# Patient Record
Sex: FEMALE | Race: WHITE | NOT HISPANIC OR LATINO
[De-identification: names, ages, dates, MRNs, and addresses within clinical notes are randomized per-mention and may not be internally consistent; named-entity substitution may affect disease eponyms.]

---

## 2018-11-02 ENCOUNTER — RESULT REVIEW (OUTPATIENT)
Age: 28
End: 2018-11-02

## 2019-09-24 ENCOUNTER — RECORD ABSTRACTING (OUTPATIENT)
Age: 29
End: 2019-09-24

## 2019-09-24 ENCOUNTER — APPOINTMENT (OUTPATIENT)
Dept: OBGYN | Facility: CLINIC | Age: 29
End: 2019-09-24
Payer: COMMERCIAL

## 2019-09-24 VITALS
BODY MASS INDEX: 28.75 KG/M2 | DIASTOLIC BLOOD PRESSURE: 66 MMHG | WEIGHT: 162.25 LBS | HEIGHT: 63 IN | SYSTOLIC BLOOD PRESSURE: 98 MMHG

## 2019-09-24 DIAGNOSIS — Z20.2 CONTACT WITH AND (SUSPECTED) EXPOSURE TO INFECTIONS WITH A PREDOMINANTLY SEXUAL MODE OF TRANSMISSION: ICD-10-CM

## 2019-09-24 DIAGNOSIS — Z78.9 OTHER SPECIFIED HEALTH STATUS: ICD-10-CM

## 2019-09-24 DIAGNOSIS — Z30.41 ENCOUNTER FOR SURVEILLANCE OF CONTRACEPTIVE PILLS: ICD-10-CM

## 2019-09-24 DIAGNOSIS — Z82.49 FAMILY HISTORY OF ISCHEMIC HEART DISEASE AND OTHER DISEASES OF THE CIRCULATORY SYSTEM: ICD-10-CM

## 2019-09-24 DIAGNOSIS — Z80.41 FAMILY HISTORY OF MALIGNANT NEOPLASM OF OVARY: ICD-10-CM

## 2019-09-24 DIAGNOSIS — Z11.51 ENCOUNTER FOR SCREENING FOR HUMAN PAPILLOMAVIRUS (HPV): ICD-10-CM

## 2019-09-24 DIAGNOSIS — Z01.419 ENCOUNTER FOR GYNECOLOGICAL EXAMINATION (GENERAL) (ROUTINE) W/OUT ABNORMAL FINDINGS: ICD-10-CM

## 2019-09-24 DIAGNOSIS — Z12.4 ENCOUNTER FOR SCREENING FOR MALIGNANT NEOPLASM OF CERVIX: ICD-10-CM

## 2019-09-24 PROBLEM — Z00.00 ENCOUNTER FOR PREVENTIVE HEALTH EXAMINATION: Status: ACTIVE | Noted: 2019-09-24

## 2019-09-24 LAB — CYTOLOGY CVX/VAG DOC THIN PREP: NEGATIVE

## 2019-09-24 PROCEDURE — 99385 PREV VISIT NEW AGE 18-39: CPT

## 2019-09-24 NOTE — HISTORY OF PRESENT ILLNESS
[Normal Amount/Duration] : was of a normal amount and duration [Regular Cycle Intervals] : periods have been regular [Frequency: Q ___ days] : menstrual periods occur approximately every [unfilled] days [Menarche Age: ____] : age at menarche was [unfilled] [Sexually Active] : is sexually active [Spotting Between  Menses] : no spotting between menses [Contraception] : does not use contraception

## 2019-09-24 NOTE — PHYSICAL EXAM
[Awake] : awake [Alert] : alert [Mass] : no breast mass [Acute Distress] : no acute distress [Nipple Discharge] : no nipple discharge [Axillary LAD] : no axillary lymphadenopathy [Soft] : soft [Oriented x3] : oriented to person, place, and time [Tender] : non tender [Normal] : uterus [No Bleeding] : there was no active vaginal bleeding [Uterine Adnexae] : were not tender and not enlarged

## 2019-09-26 LAB — HPV HIGH+LOW RISK DNA PNL CVX: NOT DETECTED

## 2019-10-02 LAB — CYTOLOGY CVX/VAG DOC THIN PREP: NORMAL

## 2020-04-03 ENCOUNTER — RESULT REVIEW (OUTPATIENT)
Age: 30
End: 2020-04-03

## 2020-10-08 ENCOUNTER — OUTPATIENT (OUTPATIENT)
Dept: OUTPATIENT SERVICES | Facility: HOSPITAL | Age: 30
LOS: 1 days | End: 2020-10-08
Payer: COMMERCIAL

## 2020-10-08 DIAGNOSIS — Z11.59 ENCOUNTER FOR SCREENING FOR OTHER VIRAL DISEASES: ICD-10-CM

## 2020-10-08 LAB — SARS-COV-2 RNA SPEC QL NAA+PROBE: SIGNIFICANT CHANGE UP

## 2020-10-08 PROCEDURE — U0003: CPT

## 2020-10-09 ENCOUNTER — INPATIENT (INPATIENT)
Facility: HOSPITAL | Age: 30
LOS: 2 days | Discharge: HOME | End: 2020-10-12
Payer: COMMERCIAL

## 2020-10-09 ENCOUNTER — TRANSCRIPTION ENCOUNTER (OUTPATIENT)
Age: 30
End: 2020-10-09

## 2020-10-09 VITALS
RESPIRATION RATE: 17 BRPM | HEART RATE: 88 BPM | SYSTOLIC BLOOD PRESSURE: 118 MMHG | TEMPERATURE: 98 F | DIASTOLIC BLOOD PRESSURE: 72 MMHG

## 2020-10-09 DIAGNOSIS — O47.1 FALSE LABOR AT OR AFTER 37 COMPLETED WEEKS OF GESTATION: ICD-10-CM

## 2020-10-09 DIAGNOSIS — Z98.890 OTHER SPECIFIED POSTPROCEDURAL STATES: Chronic | ICD-10-CM

## 2020-10-09 DIAGNOSIS — J34.2 DEVIATED NASAL SEPTUM: Chronic | ICD-10-CM

## 2020-10-09 LAB
BASOPHILS # BLD AUTO: 0.03 K/UL — SIGNIFICANT CHANGE UP (ref 0–0.2)
BASOPHILS NFR BLD AUTO: 0.2 % — SIGNIFICANT CHANGE UP (ref 0–2)
BLD GP AB SCN SERPL QL: SIGNIFICANT CHANGE UP
EOSINOPHIL # BLD AUTO: 0.24 K/UL — SIGNIFICANT CHANGE UP (ref 0–0.5)
EOSINOPHIL NFR BLD AUTO: 2 % — SIGNIFICANT CHANGE UP (ref 0–6)
HCT VFR BLD CALC: 35 % — SIGNIFICANT CHANGE UP (ref 34.5–45)
HGB BLD-MCNC: 11.5 G/DL — SIGNIFICANT CHANGE UP (ref 11.5–15.5)
IMM GRANULOCYTES NFR BLD AUTO: 1.1 % — SIGNIFICANT CHANGE UP (ref 0–1.5)
LYMPHOCYTES # BLD AUTO: 2.53 K/UL — SIGNIFICANT CHANGE UP (ref 1–3.3)
LYMPHOCYTES # BLD AUTO: 20.7 % — SIGNIFICANT CHANGE UP (ref 13–44)
MCHC RBC-ENTMCNC: 30.6 PG — SIGNIFICANT CHANGE UP (ref 27–34)
MCHC RBC-ENTMCNC: 32.9 GM/DL — SIGNIFICANT CHANGE UP (ref 32–36)
MCV RBC AUTO: 93.1 FL — SIGNIFICANT CHANGE UP (ref 80–100)
MONOCYTES # BLD AUTO: 0.66 K/UL — SIGNIFICANT CHANGE UP (ref 0–0.9)
MONOCYTES NFR BLD AUTO: 5.4 % — SIGNIFICANT CHANGE UP (ref 2–14)
NEUTROPHILS # BLD AUTO: 8.64 K/UL — HIGH (ref 1.8–7.4)
NEUTROPHILS NFR BLD AUTO: 70.6 % — SIGNIFICANT CHANGE UP (ref 43–77)
PLATELET # BLD AUTO: 284 K/UL — SIGNIFICANT CHANGE UP (ref 150–400)
RBC # BLD: 3.76 M/UL — LOW (ref 3.8–5.2)
RBC # FLD: 13.2 % — SIGNIFICANT CHANGE UP (ref 10.3–14.5)
WBC # BLD: 12.23 K/UL — HIGH (ref 3.8–10.5)
WBC # FLD AUTO: 12.23 K/UL — HIGH (ref 3.8–10.5)

## 2020-10-09 RX ORDER — CITRIC ACID/SODIUM CITRATE 300-500 MG
30 SOLUTION, ORAL ORAL ONCE
Refills: 0 | Status: COMPLETED | OUTPATIENT
Start: 2020-10-09 | End: 2020-10-10

## 2020-10-09 RX ORDER — OXYTOCIN 10 UNIT/ML
333.33 VIAL (ML) INJECTION
Qty: 20 | Refills: 0 | Status: DISCONTINUED | OUTPATIENT
Start: 2020-10-09 | End: 2020-10-12

## 2020-10-09 RX ORDER — SODIUM CHLORIDE 9 MG/ML
1000 INJECTION, SOLUTION INTRAVENOUS
Refills: 0 | Status: DISCONTINUED | OUTPATIENT
Start: 2020-10-09 | End: 2020-10-10

## 2020-10-09 RX ADMIN — SODIUM CHLORIDE 125 MILLILITER(S): 9 INJECTION, SOLUTION INTRAVENOUS at 19:33

## 2020-10-09 NOTE — OB PROVIDER H&P - NSHPPHYSICALEXAM_GEN_ALL_CORE
Vital Signs Last 24 Hrs  T(C): 36.7 (09 Oct 2020 18:50), Max: 36.7 (09 Oct 2020 18:49)  T(F): 98.1 (09 Oct 2020 18:50), Max: 98.1 (09 Oct 2020 18:50)  HR: 88 (09 Oct 2020 18:50) (88 - 88)  BP: 118/72 (09 Oct 2020 18:50) (118/72 - 118/72)  RR: 17 (09 Oct 2020 18:50) (17 - 17)    General: NAD, well-appearing  Heart: RRR  Lungs: CTAB  Abdominal: soft, gravid  Ext: non-tender, non-edematous   SVE: 0/0/-3 at 2021  Bedside sono: vertex, posterior placenta, EFW 3200g Vital Signs Last 24 Hrs  T(C): 36.7 (09 Oct 2020 18:50), Max: 36.7 (09 Oct 2020 18:49)  T(F): 98.1 (09 Oct 2020 18:50), Max: 98.1 (09 Oct 2020 18:50)  HR: 88 (09 Oct 2020 18:50) (88 - 88)  BP: 118/72 (09 Oct 2020 18:50) (118/72 - 118/72)  RR: 17 (09 Oct 2020 18:50) (17 - 17)    General: NAD, well-appearing  Heart: RRR  Lungs: CTAB  Abdominal: soft, gravid  Ext: non-tender, non-edematous   SVE: 0/0/-3 at 2021  Bedside sono: vertex, posterior placenta, EFW 3200g  FHT: baseline 140s, moderate variability, +accels, -decels   [place]: Uterine irritability

## 2020-10-09 NOTE — OB PROVIDER H&P - ASSESSMENT
A/P: 30 year old  at 40.5 weeks GA by LMP consistent with a 7.6 weeks sono who is being admitted for an elective induction of labor.  -Admit to L&D  -Consent  -Admission labs  -NPO, except ice chips   -IV fluids  -Labor: Intact. Not in labor. No contractions. Will begin induction of labor with cytotec PO.   -Fetus: Reactive. Continuous toco and fetal monitoring.   -GBS: Negative, no GBS ppx required   -Analgesia: Will desire an epidural.   -DVT ppx: Ambulate and SCD's while in bed   -Male fetus, will desire a circumcision     Discussed with Dr. Kang.

## 2020-10-10 LAB — T PALLIDUM AB TITR SER: NEGATIVE — SIGNIFICANT CHANGE UP

## 2020-10-10 RX ORDER — FAMOTIDINE 10 MG/ML
20 INJECTION INTRAVENOUS ONCE
Refills: 0 | Status: COMPLETED | OUTPATIENT
Start: 2020-10-10 | End: 2020-10-10

## 2020-10-10 RX ORDER — DIPHENHYDRAMINE HCL 50 MG
25 CAPSULE ORAL EVERY 6 HOURS
Refills: 0 | Status: DISCONTINUED | OUTPATIENT
Start: 2020-10-10 | End: 2020-10-12

## 2020-10-10 RX ORDER — ENOXAPARIN SODIUM 100 MG/ML
40 INJECTION SUBCUTANEOUS EVERY 24 HOURS
Refills: 0 | Status: DISCONTINUED | OUTPATIENT
Start: 2020-10-10 | End: 2020-10-12

## 2020-10-10 RX ORDER — LANOLIN
1 OINTMENT (GRAM) TOPICAL EVERY 6 HOURS
Refills: 0 | Status: DISCONTINUED | OUTPATIENT
Start: 2020-10-10 | End: 2020-10-12

## 2020-10-10 RX ORDER — SODIUM CHLORIDE 9 MG/ML
1000 INJECTION, SOLUTION INTRAVENOUS
Refills: 0 | Status: DISCONTINUED | OUTPATIENT
Start: 2020-10-10 | End: 2020-10-11

## 2020-10-10 RX ORDER — NALOXONE HYDROCHLORIDE 4 MG/.1ML
0.1 SPRAY NASAL
Refills: 0 | Status: DISCONTINUED | OUTPATIENT
Start: 2020-10-10 | End: 2020-10-12

## 2020-10-10 RX ORDER — CEFAZOLIN SODIUM 1 G
2000 VIAL (EA) INJECTION ONCE
Refills: 0 | Status: COMPLETED | OUTPATIENT
Start: 2020-10-10 | End: 2020-10-10

## 2020-10-10 RX ORDER — OXYTOCIN 10 UNIT/ML
333.33 VIAL (ML) INJECTION
Qty: 20 | Refills: 0 | Status: DISCONTINUED | OUTPATIENT
Start: 2020-10-10 | End: 2020-10-12

## 2020-10-10 RX ORDER — ACETAMINOPHEN 500 MG
975 TABLET ORAL
Refills: 0 | Status: DISCONTINUED | OUTPATIENT
Start: 2020-10-10 | End: 2020-10-12

## 2020-10-10 RX ORDER — IBUPROFEN 200 MG
600 TABLET ORAL EVERY 6 HOURS
Refills: 0 | Status: COMPLETED | OUTPATIENT
Start: 2020-10-10 | End: 2021-09-08

## 2020-10-10 RX ORDER — MAGNESIUM HYDROXIDE 400 MG/1
30 TABLET, CHEWABLE ORAL
Refills: 0 | Status: DISCONTINUED | OUTPATIENT
Start: 2020-10-10 | End: 2020-10-12

## 2020-10-10 RX ORDER — DEXAMETHASONE 0.5 MG/5ML
4 ELIXIR ORAL EVERY 6 HOURS
Refills: 0 | Status: DISCONTINUED | OUTPATIENT
Start: 2020-10-10 | End: 2020-10-12

## 2020-10-10 RX ORDER — ONDANSETRON 8 MG/1
4 TABLET, FILM COATED ORAL ONCE
Refills: 0 | Status: COMPLETED | OUTPATIENT
Start: 2020-10-10 | End: 2020-10-10

## 2020-10-10 RX ORDER — TETANUS TOXOID, REDUCED DIPHTHERIA TOXOID AND ACELLULAR PERTUSSIS VACCINE, ADSORBED 5; 2.5; 8; 8; 2.5 [IU]/.5ML; [IU]/.5ML; UG/.5ML; UG/.5ML; UG/.5ML
0.5 SUSPENSION INTRAMUSCULAR ONCE
Refills: 0 | Status: COMPLETED | OUTPATIENT
Start: 2020-10-10

## 2020-10-10 RX ORDER — ONDANSETRON 8 MG/1
4 TABLET, FILM COATED ORAL EVERY 6 HOURS
Refills: 0 | Status: DISCONTINUED | OUTPATIENT
Start: 2020-10-10 | End: 2020-10-12

## 2020-10-10 RX ORDER — OXYCODONE HYDROCHLORIDE 5 MG/1
5 TABLET ORAL
Refills: 0 | Status: DISCONTINUED | OUTPATIENT
Start: 2020-10-10 | End: 2020-10-12

## 2020-10-10 RX ORDER — OXYCODONE HYDROCHLORIDE 5 MG/1
5 TABLET ORAL ONCE
Refills: 0 | Status: DISCONTINUED | OUTPATIENT
Start: 2020-10-10 | End: 2020-10-12

## 2020-10-10 RX ORDER — SIMETHICONE 80 MG/1
80 TABLET, CHEWABLE ORAL EVERY 4 HOURS
Refills: 0 | Status: DISCONTINUED | OUTPATIENT
Start: 2020-10-10 | End: 2020-10-12

## 2020-10-10 RX ORDER — KETOROLAC TROMETHAMINE 30 MG/ML
30 SYRINGE (ML) INJECTION EVERY 6 HOURS
Refills: 0 | Status: DISCONTINUED | OUTPATIENT
Start: 2020-10-10 | End: 2020-10-11

## 2020-10-10 RX ADMIN — FAMOTIDINE 20 MILLIGRAM(S): 10 INJECTION INTRAVENOUS at 03:08

## 2020-10-10 RX ADMIN — Medication 100 MILLIGRAM(S): at 18:09

## 2020-10-10 RX ADMIN — ONDANSETRON 4 MILLIGRAM(S): 8 TABLET, FILM COATED ORAL at 05:07

## 2020-10-10 RX ADMIN — Medication 30 MILLILITER(S): at 17:30

## 2020-10-10 RX ADMIN — Medication 1000 MILLIUNIT(S)/MIN: at 18:18

## 2020-10-10 RX ADMIN — ONDANSETRON 4 MILLIGRAM(S): 8 TABLET, FILM COATED ORAL at 19:58

## 2020-10-10 RX ADMIN — SODIUM CHLORIDE 125 MILLILITER(S): 9 INJECTION, SOLUTION INTRAVENOUS at 23:20

## 2020-10-10 NOTE — OB PROVIDER LABOR PROGRESS NOTE - NS_SUBJECTIVE/OBJECTIVE_OBGYN_ALL_OB_FT
Patient seen and examined at bedside. She is doing well. No complaints at this time.   Vital Signs Last 24 Hrs  T(C): 36.9 (09 Oct 2020 21:05), Max: 36.9 (09 Oct 2020 21:05)  T(F): 98.42 (09 Oct 2020 21:05), Max: 98.42 (09 Oct 2020 21:05)  HR: 88 (09 Oct 2020 21:05) (88 - 88)  BP: 107/61 (09 Oct 2020 21:05) (107/61 - 118/72)  RR: 16 (09 Oct 2020 21:05) (16 - 17)

## 2020-10-10 NOTE — OB PROVIDER DELIVERY SUMMARY - NSPROVIDERDELIVERYNOTE_OBGYN_ALL_OB_FT
Pt taken to the OR for primary C/S due to failure to progress, but once in the OR, ++NRFHT and C/S made urgent.  C/S performed under spinal anesthesia  (given prior to fetal deceleration) by Dr IVÁN Owen, assist Dr EMILY Dubose, PGY-4.  Delivered live male infant, VTX, through clear amniotic fluid at 18:17.  No nuchal cord.  Delayed cord clamping for 30sec, then  to warmer, neonatologist present.  APGAR 9/9, weight 3160g, 6lb 15oz.  Uterus, tubes, ovaries WNL.  No intraop complications.  QBL 835cc, UO 100cc, clear.  Baby later admitted to N, mother stable.

## 2020-10-10 NOTE — OB PROVIDER LABOR PROGRESS NOTE - NS_SUBJECTIVE/OBJECTIVE_OBGYN_ALL_OB_FT
29 y/o  at 40w6d admitted for elective induction of labor.   Patient examined at bedside after a deceleration was noted on FHT.

## 2020-10-10 NOTE — OB NEONATOLOGY/PEDIATRICIAN DELIVERY SUMMARY - NSPEDSNEONOTESA_OBGYN_ALL_OB_FT
Called to OR #1  by Phuong Owen MD to attend primary C/S at 40.6 weeks failure to descend and NRFHR tracing.  Maternal Obstetric/Medical History:  30 year old  , blood type A positive, serology NR , HBsAg negative , GBS negative , HIV negative , Rubella immune .  EDC 10/4/2020.  Denies allergies, denies diabetes, denies hypertension, denies asthma.  Family History:  Non contributory  Social History:  , denies smoking, denies alcohol abuse, denies illicit drug use.  Labor and delivery.  AROM 10/10/2020 @  C/S  with clear amniotic fluid..  Delivered  10/10/2020 @ 1817 hours with good cry, alert and responsive.   Place on radiant warmer bed, dried, repositioned and suctioned with bulb syringe.  Physical examination benign. consistent with term gestation.  Apgar score 9 and 9 at 1 and 5 minutes respectively.   Male.  Bwt: 3160g..

## 2020-10-10 NOTE — OB PROVIDER LABOR PROGRESS NOTE - NS_OBIHIFHRDETAILS_OBGYN_ALL_OB_FT
baseline 135, mod variability, no accels, no decels
baseline 125, moderate variability, +accels, -decels
baseline 125, moderate variability, +accels, one 2-3 min decel noted with recovery and no recurrence

## 2020-10-10 NOTE — OB RN DELIVERY SUMMARY - NS_SEPSISRSKCALC_OBGYN_ALL_OB_FT
EOS calculated successfully. EOS Risk Factor: 0.5/1000 live births ([facility] national incidence); GA=40w6d; Temp=98.42; ROM=0.017; GBS='Negative'; Antibiotics='No antibiotics or any antibiotics < 2 hrs prior to birth'

## 2020-10-10 NOTE — OB PROVIDER LABOR PROGRESS NOTE - ASSESSMENT
-Cat I tracing  -last checked @14:00 by Dr Owen, 0/0/-3  -reassess  -continue to monitor
30yp  at 40.6 weeks GA here for an elective induction of labor.  -VSS  -Cat 1 tracing  -Uterine irritability  -Intact  -Continue induction with cytotec PO
31 y/o  at 40w6d admitted for elective induction of labor.   - FHT deceleration resolved with maternal repositioning IV fluids, and O2   - continue Cytotec PO course  - Will continue to monitor FHT/[place] and reassess for cervical change when clinically indicated.     d/w Dr. Kang

## 2020-10-11 ENCOUNTER — TRANSCRIPTION ENCOUNTER (OUTPATIENT)
Age: 30
End: 2020-10-11

## 2020-10-11 LAB
BASOPHILS # BLD AUTO: 0.04 K/UL — SIGNIFICANT CHANGE UP (ref 0–0.2)
BASOPHILS NFR BLD AUTO: 0.3 % — SIGNIFICANT CHANGE UP (ref 0–2)
EOSINOPHIL # BLD AUTO: 0.35 K/UL — SIGNIFICANT CHANGE UP (ref 0–0.5)
EOSINOPHIL NFR BLD AUTO: 2.4 % — SIGNIFICANT CHANGE UP (ref 0–6)
HCT VFR BLD CALC: 29.2 % — LOW (ref 34.5–45)
HGB BLD-MCNC: 9.3 G/DL — LOW (ref 11.5–15.5)
IMM GRANULOCYTES NFR BLD AUTO: 0.5 % — SIGNIFICANT CHANGE UP (ref 0–1.5)
LYMPHOCYTES # BLD AUTO: 1.9 K/UL — SIGNIFICANT CHANGE UP (ref 1–3.3)
LYMPHOCYTES # BLD AUTO: 13 % — SIGNIFICANT CHANGE UP (ref 13–44)
MCHC RBC-ENTMCNC: 30.2 PG — SIGNIFICANT CHANGE UP (ref 27–34)
MCHC RBC-ENTMCNC: 31.8 GM/DL — LOW (ref 32–36)
MCV RBC AUTO: 94.8 FL — SIGNIFICANT CHANGE UP (ref 80–100)
MONOCYTES # BLD AUTO: 0.72 K/UL — SIGNIFICANT CHANGE UP (ref 0–0.9)
MONOCYTES NFR BLD AUTO: 4.9 % — SIGNIFICANT CHANGE UP (ref 2–14)
NEUTROPHILS # BLD AUTO: 11.48 K/UL — HIGH (ref 1.8–7.4)
NEUTROPHILS NFR BLD AUTO: 78.9 % — HIGH (ref 43–77)
PLATELET # BLD AUTO: 206 K/UL — SIGNIFICANT CHANGE UP (ref 150–400)
RBC # BLD: 3.08 M/UL — LOW (ref 3.8–5.2)
RBC # FLD: 13.4 % — SIGNIFICANT CHANGE UP (ref 10.3–14.5)
WBC # BLD: 14.56 K/UL — HIGH (ref 3.8–10.5)
WBC # FLD AUTO: 14.56 K/UL — HIGH (ref 3.8–10.5)

## 2020-10-11 RX ORDER — SODIUM CHLORIDE 9 MG/ML
1000 INJECTION, SOLUTION INTRAVENOUS
Refills: 0 | Status: COMPLETED | OUTPATIENT
Start: 2020-10-11 | End: 2020-10-11

## 2020-10-11 RX ORDER — TETANUS TOXOID, REDUCED DIPHTHERIA TOXOID AND ACELLULAR PERTUSSIS VACCINE, ADSORBED 5; 2.5; 8; 8; 2.5 [IU]/.5ML; [IU]/.5ML; UG/.5ML; UG/.5ML; UG/.5ML
0.5 SUSPENSION INTRAMUSCULAR ONCE
Refills: 0 | Status: COMPLETED | OUTPATIENT
Start: 2020-10-11 | End: 2020-10-11

## 2020-10-11 RX ORDER — IBUPROFEN 200 MG
600 TABLET ORAL EVERY 6 HOURS
Refills: 0 | Status: DISCONTINUED | OUTPATIENT
Start: 2020-10-11 | End: 2020-10-12

## 2020-10-11 RX ORDER — BETHANECHOL CHLORIDE 25 MG
25 TABLET ORAL ONCE
Refills: 0 | Status: DISCONTINUED | OUTPATIENT
Start: 2020-10-11 | End: 2020-10-11

## 2020-10-11 RX ADMIN — Medication 30 MILLIGRAM(S): at 01:06

## 2020-10-11 RX ADMIN — Medication 975 MILLIGRAM(S): at 15:42

## 2020-10-11 RX ADMIN — Medication 30 MILLIGRAM(S): at 00:51

## 2020-10-11 RX ADMIN — Medication 975 MILLIGRAM(S): at 21:18

## 2020-10-11 RX ADMIN — SIMETHICONE 80 MILLIGRAM(S): 80 TABLET, CHEWABLE ORAL at 12:01

## 2020-10-11 RX ADMIN — Medication 975 MILLIGRAM(S): at 08:48

## 2020-10-11 RX ADMIN — Medication 600 MILLIGRAM(S): at 18:30

## 2020-10-11 RX ADMIN — Medication 975 MILLIGRAM(S): at 22:00

## 2020-10-11 RX ADMIN — Medication 600 MILLIGRAM(S): at 17:42

## 2020-10-11 RX ADMIN — Medication 975 MILLIGRAM(S): at 09:30

## 2020-10-11 RX ADMIN — Medication 30 MILLIGRAM(S): at 12:01

## 2020-10-11 RX ADMIN — Medication 30 MILLIGRAM(S): at 06:12

## 2020-10-11 RX ADMIN — Medication 30 MILLIGRAM(S): at 05:57

## 2020-10-11 RX ADMIN — Medication 30 MILLIGRAM(S): at 12:16

## 2020-10-11 RX ADMIN — TETANUS TOXOID, REDUCED DIPHTHERIA TOXOID AND ACELLULAR PERTUSSIS VACCINE, ADSORBED 0.5 MILLILITER(S): 5; 2.5; 8; 8; 2.5 SUSPENSION INTRAMUSCULAR at 15:44

## 2020-10-11 RX ADMIN — SIMETHICONE 80 MILLIGRAM(S): 80 TABLET, CHEWABLE ORAL at 17:42

## 2020-10-11 RX ADMIN — Medication 975 MILLIGRAM(S): at 16:30

## 2020-10-11 RX ADMIN — SODIUM CHLORIDE 150 MILLILITER(S): 9 INJECTION, SOLUTION INTRAVENOUS at 06:51

## 2020-10-11 RX ADMIN — ENOXAPARIN SODIUM 40 MILLIGRAM(S): 100 INJECTION SUBCUTANEOUS at 05:57

## 2020-10-11 NOTE — PROGRESS NOTE ADULT - ASSESSMENT
ASSESSMENT:  MALIKA SHELL is a 30y  s/p uncomplicated urgent CS POD #1 NRFHT, MALE INFANT, DESIRES CIRCUMCISION. Patient has no complaints at this time. Incision healing well. Post-op labs reviewed and WBNL. VSS.     #Postpartum   - Continue routine post-operative  and post-partum care  - Regular diet, advance as tolerated  - Pending TOV after trevizo removed in AM  - Continue with current pain management  - Encourage maternal- bonding  - Encourage ambulation. Continue with SCDs and prophylactic Lovenox for DVT ppx  - Plan for discharge on post-partum day 1or 2 per Attending's approval

## 2020-10-11 NOTE — DISCHARGE NOTE OB - PLAN OF CARE
Rapid recovery 1) Please take ibuprofen or tylenol as needed for pain as prescribed.  2) Nothing in the vagina for 6 weeks (including no sex, no tampons, and no douching).  3) Please call your doctor for a follow up your postpartum appointment in 2 weeks.  4) Please continue taking vitamins postpartum.   5) Please call the office sooner if you have heavy vaginal bleeding, severe abdominal pain, or fever > 100.4F.  6) You may resume regular daily activity as tolerated

## 2020-10-11 NOTE — PROGRESS NOTE ADULT - SUBJECTIVE AND OBJECTIVE BOX
Delivery Post Partum Progress Note    MALIKA SHELL is a 30y  s/p uncomplicated urgent CS POD #1 NRFHT, MALE INFANT, DESIRES CIRCUMCISION.    Patient was seen and examined at bedside.     SUBJECTIVE:  No acute events overnight per nursing.   Reports feeling well this morning.   Pain is well controlled with PRN pain medication.   Tolerating PO without N/V. No flatus. No BM.   Castaneda in place. Ambulating without assistance.   Reports mild lochia which is decreasing.    She is breastfeeding and the baby is latching on. Patient is bonding with infant.  Denies fevers, chills, shortness of breath, headaches, chest pain, vision changes or calf pain.      OBJECTIVE:  Physical exam:  General: AOx3, NAD.  Heart: RRR. S1S2.  Lungs: CTABL. Good airflow bilaterally.   Abdomen: +BS, Soft, appropriately tender, nondistended, no guarding or rebound tenderness, firm uterine fundus at umbilicus  Incision: clean dry and intact with Vanessa. No erythema or discharge.  Ext: No DVT signs, warm extremities.    Vital Signs Last 24 Hrs  T(C): 36.8 (11 Oct 2020 04:40), Max: 36.9 (10 Oct 2020 21:29)  T(F): 98.2 (11 Oct 2020 04:40), Max: 98.4 (10 Oct 2020 21:29)  HR: 91 (11 Oct 2020 04:40) (74 - 91)  BP: 99/65 (11 Oct 2020 04:40) (99/65 - 137/76)  RR: 18 (11 Oct 2020 04:40) (15 - 20)  SpO2: 100% (11 Oct 2020 04:40) (98% - 100%)      10-09-20 @ 07:  -  10-10-20 @ 07:00  --------------------------------------------------------  IN: 1250 mL / OUT: 650 mL / NET: 600 mL    10-10-20 @ 07:01  -  10-11-20 @ 06:24  --------------------------------------------------------  IN: 3925 mL / OUT: 1360 mL / NET: 2565 mL        LABS:                          9.3    14.56 )-----------( 206      ( 11 Oct 2020 05:04 )             29.2 ,                       11.5   12.23 )-----------( 284      ( 09 Oct 2020 19:31 )             35.0

## 2020-10-11 NOTE — PROGRESS NOTE ADULT - SUBJECTIVE AND OBJECTIVE BOX
Subjective:  The patient feels well.  She is tolerating regular diet.  She denies nausea and vomiting.  Her pain is controlled.  She reports normal postpartum bleeding.      Physical exam:    Vital Signs Last 24 Hrs  T(C): 36.9 (11 Oct 2020 08:14), Max: 36.9 (10 Oct 2020 21:29)  T(F): 98.4 (11 Oct 2020 08:14), Max: 98.4 (10 Oct 2020 21:29)  HR: 75 (11 Oct 2020 08:14) (74 - 91)  BP: 104/67 (11 Oct 2020 08:14) (99/65 - 137/76)  BP(mean): --  RR: 18 (11 Oct 2020 08:14) (15 - 20)  SpO2: 96% (11 Oct 2020 08:14) (96% - 100%)    Gen: NAD  Breast: Soft, nontender, not engorged.  Abdomen: Soft, nontender, no distension , firm uterine fundus at umbilicus.  Incision: Clean, dry, and intact with steri strips  Pelvic: Normal lochia noted  Passing flatus  Ext: No calf tenderness    SCD's on bilateral lower extremities    LABS:                        9.3    14.56 )-----------( 206      ( 11 Oct 2020 05:04 )             29.2         POD # 1  Doing well.  Routine PO care.        .

## 2020-10-11 NOTE — DISCHARGE NOTE OB - MEDICATION SUMMARY - MEDICATIONS TO TAKE
I will START or STAY ON the medications listed below when I get home from the hospital:    acetaminophen 325 mg oral tablet  -- 2 tab(s) by mouth every 6 hours, As Needed -for mild pain - for moderate pain   -- This product contains acetaminophen.  Do not use  with any other product containing acetaminophen to prevent possible liver damage.    -- Indication: For Pain    ibuprofen 600 mg oral tablet  -- 1 tab(s) by mouth every 6 hours, As Needed -for mild pain - for moderate pain   -- Do not take this drug if you are pregnant.  It is very important that you take or use this exactly as directed.  Do not skip doses or discontinue unless directed by your doctor.  May cause drowsiness or dizziness.  Obtain medical advice before taking any non-prescription drugs as some may affect the action of this medication.  Take with food or milk.    -- Indication: For Pain

## 2020-10-11 NOTE — DISCHARGE NOTE OB - CARE PLAN
Principal Discharge DX:	 delivery delivered  Goal:	Rapid recovery  Assessment and plan of treatment:	1) Please take ibuprofen or tylenol as needed for pain as prescribed.  2) Nothing in the vagina for 6 weeks (including no sex, no tampons, and no douching).  3) Please call your doctor for a follow up your postpartum appointment in 2 weeks.  4) Please continue taking vitamins postpartum.   5) Please call the office sooner if you have heavy vaginal bleeding, severe abdominal pain, or fever > 100.4F.  6) You may resume regular daily activity as tolerated

## 2020-10-11 NOTE — DISCHARGE NOTE OB - PATIENT PORTAL LINK FT
You can access the FollowMyHealth Patient Portal offered by [facility] by registering at the following website: [email or web address]. By joining [facility]’s FollowMyHealth portal, you will also be able to view your health information using other applications (apps) compatible with our system.

## 2020-10-11 NOTE — DISCHARGE NOTE OB - CARE PROVIDER_API CALL
Phuong Owen  OBSTETRICS AND GYNECOLOGY  [address] [place] [address]  Phone: [phone]  Fax: [phone]  Follow Up Time:

## 2020-10-11 NOTE — DISCHARGE NOTE OB - HOSPITAL COURSE
Patient underwent a  delivery. Post-op course was uncomplicated. Pain is well controlled with PRN medication. She has no difficulty with ambulation, voiding, or PO intake. Lab values and vital signs are within normal limits prior to discharge.

## 2020-10-12 VITALS
DIASTOLIC BLOOD PRESSURE: 66 MMHG | SYSTOLIC BLOOD PRESSURE: 100 MMHG | RESPIRATION RATE: 18 BRPM | OXYGEN SATURATION: 99 % | TEMPERATURE: 98 F | HEART RATE: 83 BPM

## 2020-10-12 PROCEDURE — 59025 FETAL NON-STRESS TEST: CPT

## 2020-10-12 PROCEDURE — 85025 COMPLETE CBC W/AUTO DIFF WBC: CPT

## 2020-10-12 PROCEDURE — 86850 RBC ANTIBODY SCREEN: CPT

## 2020-10-12 PROCEDURE — 59050 FETAL MONITOR W/REPORT: CPT

## 2020-10-12 PROCEDURE — 86780 TREPONEMA PALLIDUM: CPT

## 2020-10-12 PROCEDURE — 86900 BLOOD TYPING SEROLOGIC ABO: CPT

## 2020-10-12 PROCEDURE — 36415 COLL VENOUS BLD VENIPUNCTURE: CPT

## 2020-10-12 PROCEDURE — 86901 BLOOD TYPING SEROLOGIC RH(D): CPT

## 2020-10-12 PROCEDURE — 90715 TDAP VACCINE 7 YRS/> IM: CPT

## 2020-10-12 RX ORDER — IBUPROFEN 200 MG
1 TABLET ORAL
Qty: 56 | Refills: 0
Start: 2020-10-12 | End: 2020-10-25

## 2020-10-12 RX ORDER — ACETAMINOPHEN 500 MG
2 TABLET ORAL
Qty: 112 | Refills: 0
Start: 2020-10-12 | End: 2020-10-25

## 2020-10-12 RX ADMIN — Medication 975 MILLIGRAM(S): at 09:34

## 2020-10-12 RX ADMIN — Medication 600 MILLIGRAM(S): at 13:15

## 2020-10-12 RX ADMIN — Medication 600 MILLIGRAM(S): at 12:30

## 2020-10-12 RX ADMIN — Medication 975 MILLIGRAM(S): at 10:05

## 2020-10-12 RX ADMIN — Medication 600 MILLIGRAM(S): at 01:20

## 2020-10-12 RX ADMIN — Medication 600 MILLIGRAM(S): at 07:00

## 2020-10-12 RX ADMIN — Medication 600 MILLIGRAM(S): at 06:06

## 2020-10-12 RX ADMIN — ENOXAPARIN SODIUM 40 MILLIGRAM(S): 100 INJECTION SUBCUTANEOUS at 06:06

## 2020-10-12 RX ADMIN — Medication 600 MILLIGRAM(S): at 00:37

## 2020-10-12 NOTE — PROGRESS NOTE ADULT - SUBJECTIVE AND OBJECTIVE BOX
Name: MALIKA SHELL  MRN: [id number]  Date Admitted: 10-09-20  Location: [facility] [id number] ([facility] 2EST)  Attending: Phuong Owen      Post Partum Note:     MALIKA SHELL is a 30y  s/p uncomplicated primary  section at 40w5d POD #2 due to NRFHT. Viable male infant.     SUBJECTIVE:  No acute events overnight. Pain is well controlled with PRN pain medication. No problems with ambulating, voiding, or PO intake. She has had flatus but no BM. Denies N/V. Patient is having normal lochia which is decreasing.    She is breastfeeding and the baby is latching on. Baby present at mom's bedside.     OBJECTIVE:    Vital Signs Last 24 Hrs  T(C): 36.7 (12 Oct 2020 04:55), Max: 36.9 (11 Oct 2020 08:14)  T(F): 98.1 (12 Oct 2020 04:55), Max: 98.4 (11 Oct 2020 08:14)  HR: 83 (12 Oct 2020 04:55) (75 - 108)  BP: 100/66 (12 Oct 2020 04:55) (100/66 - 110/68)  RR: 18 (12 Oct 2020 04:55) (18 - 18)  SpO2: 99% (12 Oct 2020 04:55) (96% - 99%)    Physical exam:  General: AOx3, NAD.  Heart: RRR. S1S2.  Lungs: CTABL. Good airflow bilaterally.   Abdomen: +BS, Soft, appropriately tender, nondistended, no guarding or rebound tenderness, firm uterine fundus at umbilicus, the incision is clean dry and intact with julio. No erythema or discharge.  Ext: No DVT signs, warm extremities.        LABS:                        9.3    14.56 )-----------( 206      ( 11 Oct 2020 05:04 )             29.2

## 2020-10-12 NOTE — PROGRESS NOTE ADULT - ASSESSMENT
MALIKA SHELL is a 30y  s/p uncomplicated primary  section at 40w5d POD #2 due to NRFHT. Viable male infant.   - Pain controlled on current medications  - Tolerating po,   - + flatus  - + void  - hgb 11.5  --> 9.3  - + Lovenox for DVT prophylaxis   - Rh +  - Pt with male infant; pt wants circumcision  - Dispo: Home pending attending approval

## 2020-11-20 ENCOUNTER — RESULT REVIEW (OUTPATIENT)
Age: 30
End: 2020-11-20

## 2020-12-21 PROBLEM — Z01.419 ENCOUNTER FOR GYNECOLOGICAL EXAMINATION: Status: RESOLVED | Noted: 2019-09-24 | Resolved: 2020-12-21

## 2021-06-02 ENCOUNTER — APPOINTMENT (OUTPATIENT)
Dept: DISASTER EMERGENCY | Facility: OTHER | Age: 31
End: 2021-06-02
Payer: COMMERCIAL

## 2021-06-02 PROCEDURE — 0001A: CPT

## 2021-06-03 PROBLEM — Z78.9 OTHER SPECIFIED HEALTH STATUS: Chronic | Status: ACTIVE | Noted: 2020-10-09

## 2021-06-23 ENCOUNTER — APPOINTMENT (OUTPATIENT)
Dept: DISASTER EMERGENCY | Facility: OTHER | Age: 31
End: 2021-06-23
Payer: COMMERCIAL

## 2021-06-23 PROCEDURE — 0002A: CPT

## 2021-12-09 ENCOUNTER — RESULT REVIEW (OUTPATIENT)
Age: 31
End: 2021-12-09

## 2022-10-13 NOTE — OB RN DELIVERY SUMMARY - NS_GBSABX_OBGYN_ALL_OB
"Pending Prescriptions:                       Disp   Refills    lisinopril (ZESTRIL) 40 MG tablet [Pharmac*90 tab*2        Sig: TAKE 1 TABLET BY MOUTH EVERY DAY    Routing refill request to provider for review/approval because:  A break in medication  Patient needs to be seen because it has been more than 1 year since last office visit.  Requested Prescriptions   Pending Prescriptions Disp Refills    lisinopril (ZESTRIL) 40 MG tablet [Pharmacy Med Name: LISINOPRIL 40 MG TABLET] 90 tablet 2     Sig: TAKE 1 TABLET BY MOUTH EVERY DAY       ACE Inhibitors (Including Combos) Protocol Passed - 10/7/2022  2:21 AM        Passed - Blood pressure under 140/90 in past 12 months     BP Readings from Last 3 Encounters:   10/14/21 130/84   09/21/20 124/80   02/18/20 126/83                 Passed - Recent (12 mo) or future (30 days) visit within the authorizing provider's specialty     Patient has had an office visit with the authorizing provider or a provider within the authorizing providers department within the previous 12 mos or has a future within next 30 days. See \"Patient Info\" tab in inbasket, or \"Choose Columns\" in Meds & Orders section of the refill encounter.              Passed - Medication is active on med list        Passed - Patient is age 18 or older        Passed - Normal serum creatinine on file in past 12 months     Recent Labs   Lab Test 10/14/21  0835   CR 0.89       Ok to refill medication if creatinine is low          Passed - Normal serum potassium on file in past 12 months     Recent Labs   Lab Test 10/14/21  0835   POTASSIUM 5.2                        "
Pt needs appt for physical . Please schedule  
mychart sent.  Tuyet Luque MA    
N/A

## 2022-12-22 NOTE — DISCHARGE NOTE OB - DO NOT DRIVE OR OPERATE HEAVY MACHINERY WHEN TAKING NARCOTICS/PRESCRIPTION PAIN MEDICATION THAT CAN CHANGE YOUR MENTAL STATE OR CAUSE DROWSINESS
12/22  H/o  T1DM, ESRD on HD (MWF), HTN, HFpEF, HLD, chronic hypoxemic resp failure 2/2 COPD (on home O2 - 5L), h/o DVT/PE on Eliquis transported by EMS from [facility] due to uncontrolled nose bleeding.  Episode of nose bleed resolved.  continue HD MWF. endocrine following for labs consistent with mild DKA, now resolved. Hypoglycemia of 30s on 12/221 Decreased Levemir to 5 units BID and  Novolog to 5 units TIDAC. sats 94% on 2LNC. continue eliquis for H/O DVT . [facility]    
Statement Selected

## 2023-06-02 NOTE — OB RN PATIENT PROFILE - NS_ISOLATION_OBGYN_ALL_OB
[FreeTextEntry1] : Edward is a 14 year old boy with a reducible left inguinal hernia. I educated mom about this diagnosis and offered reassurance. I recommended laparoscopic left, possible right, inguinal hernia repair. I discussed the indications, risks, benefits and alternatives to the procedure. The risks discussed included but were not limited to bleeding, infection, injury to intra-abdominal/pelvic contents, injury to spermatic cord/testicular loss, postoperative hydrocele, hernia recurrence, etc.  I reviewed postoperative expectations.   I counseled them about the possibility of developing an incarcerated hernia and she knows to bring Edward to the emergency room with any concerns. Mom has indicated her understanding and agrees to proceed.  We have scheduled his procedure in the upcoming weeks.   They know to contact me sooner with any further questions or concerns. 
1.85
None

## 2024-02-09 ENCOUNTER — INPATIENT (INPATIENT)
Facility: HOSPITAL | Age: 34
LOS: 2 days | Discharge: HOME | End: 2024-02-12
Payer: COMMERCIAL

## 2024-02-09 ENCOUNTER — TRANSCRIPTION ENCOUNTER (OUTPATIENT)
Age: 34
End: 2024-02-09

## 2024-02-09 ENCOUNTER — APPOINTMENT (OUTPATIENT)
Dept: ANTEPARTUM | Facility: HOSPITAL | Age: 34
End: 2024-02-09
Payer: COMMERCIAL

## 2024-02-09 VITALS — HEART RATE: 95 BPM | DIASTOLIC BLOOD PRESSURE: 59 MMHG | SYSTOLIC BLOOD PRESSURE: 108 MMHG

## 2024-02-09 DIAGNOSIS — J34.2 DEVIATED NASAL SEPTUM: Chronic | ICD-10-CM

## 2024-02-09 DIAGNOSIS — O26.893 OTHER SPECIFIED PREGNANCY RELATED CONDITIONS, THIRD TRIMESTER: ICD-10-CM

## 2024-02-09 DIAGNOSIS — Z3A.36 36 WEEKS GESTATION OF PREGNANCY: ICD-10-CM

## 2024-02-09 DIAGNOSIS — O26.899 OTHER SPECIFIED PREGNANCY RELATED CONDITIONS, UNSPECIFIED TRIMESTER: ICD-10-CM

## 2024-02-09 DIAGNOSIS — Z98.890 OTHER SPECIFIED POSTPROCEDURAL STATES: Chronic | ICD-10-CM

## 2024-02-09 DIAGNOSIS — Z98.891 HISTORY OF UTERINE SCAR FROM PREVIOUS SURGERY: Chronic | ICD-10-CM

## 2024-02-09 DIAGNOSIS — O36.5990 MATERNAL CARE FOR OTHER KNOWN OR SUSPECTED POOR FETAL GROWTH, UNSPECIFIED TRIMESTER, NOT APPLICABLE OR UNSPECIFIED: ICD-10-CM

## 2024-02-09 LAB
ALBUMIN SERPL ELPH-MCNC: 3.3 G/DL — SIGNIFICANT CHANGE UP (ref 3.3–5.2)
ALP SERPL-CCNC: 121 U/L — HIGH (ref 40–120)
ALT FLD-CCNC: 14 U/L — SIGNIFICANT CHANGE UP
ANION GAP SERPL CALC-SCNC: 22 MMOL/L — HIGH (ref 5–17)
APPEARANCE UR: CLEAR — SIGNIFICANT CHANGE UP
APTT BLD: 26.4 SEC — SIGNIFICANT CHANGE UP (ref 24.5–35.6)
AST SERPL-CCNC: 31 U/L — SIGNIFICANT CHANGE UP
BASOPHILS # BLD AUTO: 0.04 K/UL — SIGNIFICANT CHANGE UP (ref 0–0.2)
BASOPHILS NFR BLD AUTO: 0.3 % — SIGNIFICANT CHANGE UP (ref 0–2)
BILIRUB SERPL-MCNC: 0.3 MG/DL — LOW (ref 0.4–2)
BILIRUB UR-MCNC: NEGATIVE — SIGNIFICANT CHANGE UP
BLD GP AB SCN SERPL QL: SIGNIFICANT CHANGE UP
BUN SERPL-MCNC: 9.7 MG/DL — SIGNIFICANT CHANGE UP (ref 8–20)
CALCIUM SERPL-MCNC: 8.7 MG/DL — SIGNIFICANT CHANGE UP (ref 8.4–10.5)
CHLORIDE SERPL-SCNC: 93 MMOL/L — LOW (ref 96–108)
CO2 SERPL-SCNC: 22 MMOL/L — SIGNIFICANT CHANGE UP (ref 22–29)
COLOR SPEC: YELLOW — SIGNIFICANT CHANGE UP
CREAT SERPL-MCNC: 0.52 MG/DL — SIGNIFICANT CHANGE UP (ref 0.5–1.3)
DIFF PNL FLD: NEGATIVE — SIGNIFICANT CHANGE UP
EGFR: 126 ML/MIN/1.73M2 — SIGNIFICANT CHANGE UP
EOSINOPHIL # BLD AUTO: 0.17 K/UL — SIGNIFICANT CHANGE UP (ref 0–0.5)
EOSINOPHIL NFR BLD AUTO: 1.3 % — SIGNIFICANT CHANGE UP (ref 0–6)
FIBRINOGEN PPP-MCNC: 634 MG/DL — HIGH (ref 200–450)
GLUCOSE SERPL-MCNC: 66 MG/DL — LOW (ref 70–99)
GLUCOSE UR QL: NEGATIVE MG/DL — SIGNIFICANT CHANGE UP
HCT VFR BLD CALC: 37.3 % — SIGNIFICANT CHANGE UP (ref 34.5–45)
HGB BLD-MCNC: 12.4 G/DL — SIGNIFICANT CHANGE UP (ref 11.5–15.5)
IMM GRANULOCYTES NFR BLD AUTO: 0.7 % — SIGNIFICANT CHANGE UP (ref 0–0.9)
KETONES UR-MCNC: 15 MG/DL
LEUKOCYTE ESTERASE UR-ACNC: NEGATIVE — SIGNIFICANT CHANGE UP
LYMPHOCYTES # BLD AUTO: 19.1 % — SIGNIFICANT CHANGE UP (ref 13–44)
LYMPHOCYTES # BLD AUTO: 2.41 K/UL — SIGNIFICANT CHANGE UP (ref 1–3.3)
MCHC RBC-ENTMCNC: 30.1 PG — SIGNIFICANT CHANGE UP (ref 27–34)
MCHC RBC-ENTMCNC: 33.2 GM/DL — SIGNIFICANT CHANGE UP (ref 32–36)
MCV RBC AUTO: 90.5 FL — SIGNIFICANT CHANGE UP (ref 80–100)
MONOCYTES # BLD AUTO: 0.59 K/UL — SIGNIFICANT CHANGE UP (ref 0–0.9)
MONOCYTES NFR BLD AUTO: 4.7 % — SIGNIFICANT CHANGE UP (ref 2–14)
NEUTROPHILS # BLD AUTO: 9.35 K/UL — HIGH (ref 1.8–7.4)
NEUTROPHILS NFR BLD AUTO: 73.9 % — SIGNIFICANT CHANGE UP (ref 43–77)
NITRITE UR-MCNC: NEGATIVE — SIGNIFICANT CHANGE UP
PH UR: 6.5 — SIGNIFICANT CHANGE UP (ref 5–8)
PLATELET # BLD AUTO: 298 K/UL — SIGNIFICANT CHANGE UP (ref 150–400)
POTASSIUM SERPL-MCNC: 4.8 MMOL/L — SIGNIFICANT CHANGE UP (ref 3.5–5.3)
POTASSIUM SERPL-SCNC: 4.8 MMOL/L — SIGNIFICANT CHANGE UP (ref 3.5–5.3)
PROT SERPL-MCNC: 6.7 G/DL — SIGNIFICANT CHANGE UP (ref 6.6–8.7)
PROT UR-MCNC: NEGATIVE MG/DL — SIGNIFICANT CHANGE UP
RBC # BLD: 4.12 M/UL — SIGNIFICANT CHANGE UP (ref 3.8–5.2)
RBC # FLD: 13.7 % — SIGNIFICANT CHANGE UP (ref 10.3–14.5)
SODIUM SERPL-SCNC: 137 MMOL/L — SIGNIFICANT CHANGE UP (ref 135–145)
SP GR SPEC: 1.02 — SIGNIFICANT CHANGE UP (ref 1–1.03)
URATE SERPL-MCNC: 4.6 MG/DL — SIGNIFICANT CHANGE UP (ref 2.4–5.7)
UROBILINOGEN FLD QL: 1 MG/DL — SIGNIFICANT CHANGE UP (ref 0.2–1)
WBC # BLD: 12.65 K/UL — HIGH (ref 3.8–10.5)
WBC # FLD AUTO: 12.65 K/UL — HIGH (ref 3.8–10.5)

## 2024-02-09 PROCEDURE — 76816 OB US FOLLOW-UP PER FETUS: CPT | Mod: 26

## 2024-02-09 PROCEDURE — 76820 UMBILICAL ARTERY ECHO: CPT | Mod: 26,59

## 2024-02-09 PROCEDURE — 76819 FETAL BIOPHYS PROFIL W/O NST: CPT | Mod: 26,59

## 2024-02-09 PROCEDURE — 93976 VASCULAR STUDY: CPT | Mod: 26

## 2024-02-09 RX ORDER — ACETAMINOPHEN 500 MG
975 TABLET ORAL ONCE
Refills: 0 | Status: COMPLETED | OUTPATIENT
Start: 2024-02-09 | End: 2024-02-09

## 2024-02-09 RX ORDER — FAMOTIDINE 10 MG/ML
20 INJECTION INTRAVENOUS ONCE
Refills: 0 | Status: DISCONTINUED | OUTPATIENT
Start: 2024-02-09 | End: 2024-02-12

## 2024-02-09 RX ADMIN — Medication 975 MILLIGRAM(S): at 23:00

## 2024-02-09 RX ADMIN — Medication 975 MILLIGRAM(S): at 22:35

## 2024-02-09 NOTE — OB PROVIDER H&P - HISTORY OF PRESENT ILLNESS
34y  at 36w6d who presents to L&D for repeat growth US and Dopplers in the setting of known FGR. Patient denies vaginal bleeding, contractions and leakage of fluid. She endorses good fetal movement. Denies fevers, chills, nausea, vomiting, chest pain, SOB, dizziness and headache. No other complaints at this time.     Prenatal course is significant for:  -FGR     POB: G1 (10/10/2020) pCS failed IOL  PGYN: -fibroids, -ovarian cysts, denies STD hx, denies abnormal PAPs   PMH: Denies  PSH: C/S, nasal polyp removal  SH: Denies EtOH, tobacco and illicit drug use during this pregnancy; feels safe at home   Meds: PNVs  Allergies: NKDA   34y  at 36w6d who presents to L&D for repeat growth US and Dopplers in the setting of known FGR. Patient denies vaginal bleeding, contractions and leakage of fluid. She endorses good fetal movement. Denies fevers, chills, nausea, vomiting, chest pain, SOB, dizziness and headache. No other complaints at this time.     Prenatal course is significant for:  -Severe FGR 1st percentile, normal dopplers     POB: G1 (10/10/2020) pCS failed IOL  PGYN: -fibroids, -ovarian cysts, denies STD hx, denies abnormal PAPs   PMH: Denies  PSH: C/S, nasal polyp removal  SH: Denies EtOH, tobacco and illicit drug use during this pregnancy; feels safe at home   Meds: PNVs  Allergies: NKDA

## 2024-02-09 NOTE — CONSULT NOTE ADULT - ATTENDING COMMENTS
Pregnancy complicated by severe FGR, obesity, and prior  delivery. Recommend repeat  delivery at 37 weeks of gestation due to severe FGR.

## 2024-02-09 NOTE — OB PROVIDER H&P - NSHPPHYSICALEXAM_GEN_ALL_CORE
HR: 95 (02-09-24 @ 12:59) (95 - 95)  BP: 108/59 (02-09-24 @ 12:59) (108/59 - 108/59)  RR: --  SpO2: --    Gen: NAD, well-appearing, AAOx3   Abd: Soft, gravid  Ext: non-tender, non-edematous    Bedside sono: cephalic, EFW 2002g 1%ile, Dopplers wnl, MCA wnl  FHT: 140, mod alicia, +accels, -decels  [place]: none HR: 95 (02-09-24 @ 12:59) (95 - 95)  BP: 108/59 (02-09-24 @ 12:59) (108/59 - 108/59)    Gen: NAD, well-appearing, AAOx3   Abd: Soft, gravid  Ext: non-tender, non-edematous    Bedside sono: cephalic, EFW 2002g 1%ile, AC <1st %tile, Dopplers wnl, MCA wnl. BPP 8/8, NELLY 5.9   FHT: 140, mod alicia, +accels, -decels  [place]: none

## 2024-02-09 NOTE — OB PROVIDER H&P - NSLOWPPHRISK_OBGYN_A_OB
[place] Pregnancy/Less than or equal to 4 previous vaginal births/No known bleeding disorder/No history of postpartum hemorrhage

## 2024-02-09 NOTE — OB RN PATIENT PROFILE - NAME OF FATHER, OB PROFILE
Azelaic Acid Pregnancy And Lactation Text: This medication is considered safe during pregnancy and breast feeding. Anton

## 2024-02-09 NOTE — OB RN INTRAOPERATIVE NOTE - NS_DRAINS_OBGYN_ALL_OB
Pre-Operative Diagnosis: Vocal cord paralysis [J38.00]     Post-Operative Diagnosis:  Vocal cord paralysis [J38.00]     Procedure Performed:   Procedure(s):   MICROSUSPENSION DIRECT LARYNGOSCOPY WITH   LEFT VOCAL CORD INJECTION OF CYMETRA    Surgeon(s) and No

## 2024-02-09 NOTE — OB PROVIDER H&P - ASSESSMENT
34y  at 36w6d who presents to L&D for repeat growth US and Dopplers in the setting of known FGR - repeat growth showing FGR 1%ile Dopplers wnl    -Admit to L&D for rCS tomorrow  -Consent  -Admission labs  -NPO after midnight  -IV fluids  -Not in labor  -Fetus: Cat I tracing. Continuous toco and fetal monitoring.     Discussed with Dr. Owen   34y  at 36w6d who presents to L&D for repeat growth US and Dopplers in the setting of known FGR - repeat growth showing severe FGR 1%ile Dopplers wnl    -Admit to L&D for rCS tomorrow at 37 weeks   -Consent  -Admission labs  -NPO after midnight  -IV fluids  -Fetus: NST reactive. Continuous toco and fetal monitoring.     Discussed with Dr. Owen

## 2024-02-09 NOTE — CONSULT NOTE ADULT - ASSESSMENT
34y  at 36w6d who presents to L&D for repeat growth US and Dopplers in the setting of known FGR - repeat growth showing severe FGR 1%ile Dopplers wnl 34y  at 36w6d who presents to L&D for second opinion due to suspected severe FGR.

## 2024-02-09 NOTE — OB RN DELIVERY SUMMARY - NSSELHIDDEN_OBGYN_ALL_OB_FT
[NS_DeliveryAttending1_OBGYN_ALL_OB_FT:[id number]] [NS_DeliveryAttending1_OBGYN_ALL_OB_FT:[id number]],[NS_DeliveryAssist1_OBGYN_ALL_OB_FT:[id number]=],[NS_DeliveryRN_OBGYN_ALL_OB_FT:[id number]]

## 2024-02-09 NOTE — OB PROVIDER H&P - ATTENDING COMMENTS
As above, 34yr old  admitted for rpt C/S due to severe IUGR, EFW 1%.   Case discussed with MFM, who recommended admission and monitoring until 37wks due to 36.6 wk GA.  Pt does have low NELLY but over 5 and Dopplers are WNL.  Plan discussed with pt and her  at length.  R/B/A rpt C/S understood.  Discussed EFW and plan for  with pt, her , and neonatologist bedside.

## 2024-02-09 NOTE — OB RN INTRAOPERATIVE NOTE - NSSELHIDDEN_OBGYN_ALL_OB_FT
[NS_DeliveryAttending1_OBGYN_ALL_OB_FT:[id number]] [NS_DeliveryAttending1_OBGYN_ALL_OB_FT:[id number]],[NS_DeliveryAssist1_OBGYN_ALL_OB_FT:[id number]],[NS_DeliveryRN_OBGYN_ALL_OB_FT:[id number]] [NS_DeliveryAttending1_OBGYN_ALL_OB_FT:[id number]==],[NS_DeliveryAssist1_OBGYN_ALL_OB_FT:[id number]=],[NS_DeliveryRN_OBGYN_ALL_OB_FT:[id number]=]

## 2024-02-09 NOTE — CONSULT NOTE ADULT - SUBJECTIVE AND OBJECTIVE BOX
MALIKA SHELL  34y  at 36w6d who presents to L&D for repeat growth US and Dopplers in the setting of known FGR. Patient denies vaginal bleeding, contractions and leakage of fluid. She endorses good fetal movement. Denies fevers, chills, nausea, vomiting, chest pain, SOB, dizziness and headache. No other complaints at this time.     Prenatal course is significant for:  -Severe FGR 1st percentile, normal dopplers     POB: G1 (10/10/2020) pCS failed IOL  PGYN: -fibroids, -ovarian cysts, denies STD hx, denies abnormal PAPs   PMH: Denies  PSH: C/S, nasal polyp removal  SH: Denies EtOH, tobacco and illicit drug use during this pregnancy; feels safe at home   Meds: PNVs  Allergies: NKDA    SUBJECTIVE:    REVIEW OF SYSTEMS:    CONSTITUTIONAL: No weakness, fevers or chills  EYES/ENT: No visual changes;  No vertigo or throat pain   NECK: No pain or stiffness  RESPIRATORY: No cough, wheezing, hemoptysis; No shortness of breath  CARDIOVASCULAR: No chest pain or palpitations  GASTROINTESTINAL: No abdominal or epigastric pain. No nausea, vomiting, or hematemesis; No diarrhea or constipation. No melena or hematochezia.  GENITOURINARY: No dysuria, frequency or hematuria  NEUROLOGICAL: No numbness or weakness  SKIN: No itching, burning, rashes, or lesions   All other review of systems is negative unless indicated above.      Vital Signs:  Vital Signs Last 24 Hrs  T(C): 36.8 (2024 15:40), Max: 36.8 (2024 15:40)  T(F): 98.2 (2024 15:40), Max: 98.2 (2024 15:40)  HR: 95 (2024 15:40) (95 - 95)  BP: 108/59 (2024 15:40) (108/59 - 108/59)  RR: 16 (2024 15:40) (16 - 16)      Parameters below as of 2024 15:40  Patient On (Oxygen Delivery Method): room air      Height (cm): 160 (24 @ 16:03)  Weight (kg): 108 (24 @ 16:03)  BMI (kg/m2): 42.2 (24 @ 16:03)  BSA (m2): 2.08 (24 @ 16:03)    Physical Exam:  General: Adult female in NAD  Lungs: saturating well on RA   Abdomen: soft, non-tender, gravid uterus  Pelvic: Deferred  Ext: No cyanosis, edema or calf tenderness  Skin: No rashes or lesions on exposed skin      Labs: Pending     MEDICATIONS  (STANDING):  famotidine    Tablet 20 milliGRAM(s) Oral once       MALIKA SHELL  34y  at 36w6d who presents to L&D for second opinion for suspected FGR. Patient denies vaginal bleeding, contractions and leakage of fluid. She endorses good fetal movement. Denies fevers, chills, nausea, vomiting, chest pain, SOB, dizziness and headache. No other complaints at this time.     Prenatal course is significant for:  -FGR with normal umbilical Dopplers     POB: G1 (10/10/2020) pCS failed IOL  PGYN: -fibroids, -ovarian cysts, denies STD hx, denies abnormal PAPs   PMH: Denies  PSH: C/S, nasal polyp removal  SH: Denies EtOH, tobacco and illicit drug use during this pregnancy; feels safe at home   Meds: PNVs  Allergies: NKDA    REVIEW OF SYSTEMS:    CONSTITUTIONAL: No weakness, fevers or chills  EYES/ENT: No visual changes;  No vertigo or throat pain   NECK: No pain or stiffness  RESPIRATORY: No cough, wheezing, hemoptysis; No shortness of breath  CARDIOVASCULAR: No chest pain or palpitations  GASTROINTESTINAL: No abdominal or epigastric pain. No nausea, vomiting, or hematemesis; No diarrhea or constipation. No melena or hematochezia.  GENITOURINARY: No dysuria, frequency or hematuria  NEUROLOGICAL: No numbness or weakness  SKIN: No itching, burning, rashes, or lesions   All other review of systems is negative unless indicated above.    Vital Signs:  Vital Signs Last 24 Hrs  T(C): 36.8 (2024 15:40), Max: 36.8 (2024 15:40)  T(F): 98.2 (2024 15:40), Max: 98.2 (2024 15:40)  HR: 95 (2024 15:40) (95 - 95)  BP: 108/59 (2024 15:40) (108/59 - 108/59)  RR: 16 (2024 15:40) (16 - 16)    Parameters below as of 2024 15:40  Patient On (Oxygen Delivery Method): room air    Height (cm): 160 (24 @ 16:03)  Weight (kg): 108 (24 @ 16:03)  BMI (kg/m2): 42.2 (24 @ 16:03)  BSA (m2): 2.08 (24 @ 16:03)    Physical Exam:  General: Adult female in NAD  Lungs: saturating well on RA   Abdomen: soft, non-tender, gravid uterus  Pelvic: Deferred  Ext: No cyanosis, edema or calf tenderness  Skin: No rashes or lesions on exposed skin    Labs: Pending     MEDICATIONS  (STANDING):  famotidine    Tablet 20 milliGRAM(s) Oral once

## 2024-02-09 NOTE — OB RN PATIENT PROFILE - FUNCTIONAL ASSESSMENT - DAILY ACTIVITY PT AGE POP HIDDEN
Dr. Bhakta patient Last seen by Dr. Bhakta on 10-11-16, has upcoming visit on 4-10-17. Last refill was 4-7-16 #90 with 3 refills.  OK to refill?   Adult

## 2024-02-09 NOTE — OB RN DELIVERY SUMMARY - NS_SEPSISRSKCALC_OBGYN_ALL_OB_FT
EOS calculated successfully. EOS Risk Factor: 0.5/1000 live births ([facility] [place] national incidence); GA=37w;Temp=98.78; ROM=0; GBS='Negative'; Antibiotics='No antibiotics or any antibiotics < 2 hrs prior to birth'

## 2024-02-09 NOTE — CONSULT NOTE ADULT - PROBLEM SELECTOR RECOMMENDATION 2
- Known FGR- no growth over the past few scans. EFW today 1st %tile, AC< 1st %tile.   - Recommended delivery at 37 weeks   - Admit to L&D for continuous monitoring with plan for repeat  delivery in the am - Known FGR- no growth over the past few scans. EFW today at 1st %tile for GA, AC at < 1st %tile for GA.   - Recommended delivery at 37 weeks   - Admit to L&D for continuous monitoring with plan for repeat  delivery in the AM - Known FGR- no growth over the past few scans. EFW today at 1st %tile for GA, AC at < 1st %tile for GA.   - Recommended delivery at 37 weeks for severe FGR  - Admit to L&D for continuous monitoring with plan for repeat  delivery in the AM

## 2024-02-09 NOTE — OB RN PATIENT PROFILE - FUNCTIONAL ASSESSMENT - BASIC MOBILITY 3.
----- Message from Emely Roem MD sent at 1/13/2022  9:06 AM CST -----  Please call patient that renal US showed no signs of obstructive nephropathy     4 = No assist / stand by assistance

## 2024-02-10 ENCOUNTER — TRANSCRIPTION ENCOUNTER (OUTPATIENT)
Age: 34
End: 2024-02-10

## 2024-02-10 DIAGNOSIS — Z3A.37 37 WEEKS GESTATION OF PREGNANCY: ICD-10-CM

## 2024-02-10 DIAGNOSIS — Z00.00 ENCOUNTER FOR GENERAL ADULT MEDICAL EXAMINATION WITHOUT ABNORMAL FINDINGS: ICD-10-CM

## 2024-02-10 LAB — T PALLIDUM AB TITR SER: NEGATIVE — SIGNIFICANT CHANGE UP

## 2024-02-10 RX ORDER — OXYTOCIN 10 UNIT/ML
333.33 VIAL (ML) INJECTION
Qty: 20 | Refills: 0 | Status: DISCONTINUED | OUTPATIENT
Start: 2024-02-10 | End: 2024-02-12

## 2024-02-10 RX ORDER — ACETAMINOPHEN 500 MG
975 TABLET ORAL
Refills: 0 | Status: DISCONTINUED | OUTPATIENT
Start: 2024-02-10 | End: 2024-02-12

## 2024-02-10 RX ORDER — FAMOTIDINE 10 MG/ML
20 INJECTION INTRAVENOUS ONCE
Refills: 0 | Status: COMPLETED | OUTPATIENT
Start: 2024-02-10 | End: 2024-02-10

## 2024-02-10 RX ORDER — KETOROLAC TROMETHAMINE 30 MG/ML
30 SYRINGE (ML) INJECTION EVERY 6 HOURS
Refills: 0 | Status: DISCONTINUED | OUTPATIENT
Start: 2024-02-10 | End: 2024-02-11

## 2024-02-10 RX ORDER — OXYCODONE HYDROCHLORIDE 5 MG/1
5 TABLET ORAL
Refills: 0 | Status: DISCONTINUED | OUTPATIENT
Start: 2024-02-10 | End: 2024-02-12

## 2024-02-10 RX ORDER — DIPHENHYDRAMINE HCL 50 MG
25 CAPSULE ORAL EVERY 6 HOURS
Refills: 0 | Status: DISCONTINUED | OUTPATIENT
Start: 2024-02-10 | End: 2024-02-12

## 2024-02-10 RX ORDER — ENOXAPARIN SODIUM 100 MG/ML
40 INJECTION SUBCUTANEOUS
Refills: 0 | Status: DISCONTINUED | OUTPATIENT
Start: 2024-02-10 | End: 2024-02-12

## 2024-02-10 RX ORDER — LANOLIN
1 OINTMENT (GRAM) TOPICAL EVERY 6 HOURS
Refills: 0 | Status: DISCONTINUED | OUTPATIENT
Start: 2024-02-10 | End: 2024-02-12

## 2024-02-10 RX ORDER — SIMETHICONE 80 MG/1
80 TABLET, CHEWABLE ORAL EVERY 4 HOURS
Refills: 0 | Status: DISCONTINUED | OUTPATIENT
Start: 2024-02-10 | End: 2024-02-12

## 2024-02-10 RX ORDER — SCOPALAMINE 1 MG/3D
1 PATCH, EXTENDED RELEASE TRANSDERMAL ONCE
Refills: 0 | Status: COMPLETED | OUTPATIENT
Start: 2024-02-10 | End: 2024-02-10

## 2024-02-10 RX ORDER — CITRIC ACID/SODIUM CITRATE 300-500 MG
30 SOLUTION, ORAL ORAL ONCE
Refills: 0 | Status: COMPLETED | OUTPATIENT
Start: 2024-02-10 | End: 2024-02-10

## 2024-02-10 RX ORDER — SODIUM CHLORIDE 9 MG/ML
1000 INJECTION, SOLUTION INTRAVENOUS ONCE
Refills: 0 | Status: COMPLETED | OUTPATIENT
Start: 2024-02-10 | End: 2024-02-10

## 2024-02-10 RX ORDER — SODIUM CHLORIDE 9 MG/ML
1000 INJECTION, SOLUTION INTRAVENOUS
Refills: 0 | Status: DISCONTINUED | OUTPATIENT
Start: 2024-02-10 | End: 2024-02-10

## 2024-02-10 RX ORDER — SCOPALAMINE 1 MG/3D
1 PATCH, EXTENDED RELEASE TRANSDERMAL ONCE
Refills: 0 | Status: DISCONTINUED | OUTPATIENT
Start: 2024-02-10 | End: 2024-02-12

## 2024-02-10 RX ORDER — CEFAZOLIN SODIUM 1 G
2000 VIAL (EA) INJECTION ONCE
Refills: 0 | Status: COMPLETED | OUTPATIENT
Start: 2024-02-10 | End: 2024-02-10

## 2024-02-10 RX ORDER — TETANUS TOXOID, REDUCED DIPHTHERIA TOXOID AND ACELLULAR PERTUSSIS VACCINE, ADSORBED 5; 2.5; 8; 8; 2.5 [IU]/.5ML; [IU]/.5ML; UG/.5ML; UG/.5ML; UG/.5ML
0.5 SUSPENSION INTRAMUSCULAR ONCE
Refills: 0 | Status: DISCONTINUED | OUTPATIENT
Start: 2024-02-10 | End: 2024-02-12

## 2024-02-10 RX ORDER — CHLORHEXIDINE GLUCONATE 213 G/1000ML
1 SOLUTION TOPICAL DAILY
Refills: 0 | Status: DISCONTINUED | OUTPATIENT
Start: 2024-02-10 | End: 2024-02-10

## 2024-02-10 RX ORDER — CEFAZOLIN SODIUM 1 G
2000 VIAL (EA) INJECTION ONCE
Refills: 0 | Status: DISCONTINUED | OUTPATIENT
Start: 2024-02-10 | End: 2024-02-10

## 2024-02-10 RX ORDER — MAGNESIUM HYDROXIDE 400 MG/1
30 TABLET, CHEWABLE ORAL
Refills: 0 | Status: DISCONTINUED | OUTPATIENT
Start: 2024-02-10 | End: 2024-02-12

## 2024-02-10 RX ORDER — SODIUM CHLORIDE 9 MG/ML
1000 INJECTION, SOLUTION INTRAVENOUS
Refills: 0 | Status: DISCONTINUED | OUTPATIENT
Start: 2024-02-10 | End: 2024-02-12

## 2024-02-10 RX ORDER — IBUPROFEN 200 MG
600 TABLET ORAL EVERY 6 HOURS
Refills: 0 | Status: COMPLETED | OUTPATIENT
Start: 2024-02-10 | End: 2025-01-08

## 2024-02-10 RX ORDER — ACETAMINOPHEN 500 MG
975 TABLET ORAL ONCE
Refills: 0 | Status: COMPLETED | OUTPATIENT
Start: 2024-02-10 | End: 2024-02-10

## 2024-02-10 RX ORDER — OXYCODONE HYDROCHLORIDE 5 MG/1
5 TABLET ORAL ONCE
Refills: 0 | Status: DISCONTINUED | OUTPATIENT
Start: 2024-02-10 | End: 2024-02-12

## 2024-02-10 RX ADMIN — Medication 975 MILLIGRAM(S): at 09:08

## 2024-02-10 RX ADMIN — SCOPALAMINE 1 PATCH: 1 PATCH, EXTENDED RELEASE TRANSDERMAL at 18:17

## 2024-02-10 RX ADMIN — SODIUM CHLORIDE 125 MILLILITER(S): 9 INJECTION, SOLUTION INTRAVENOUS at 05:46

## 2024-02-10 RX ADMIN — FAMOTIDINE 20 MILLIGRAM(S): 10 INJECTION INTRAVENOUS at 09:33

## 2024-02-10 RX ADMIN — Medication 30 MILLIGRAM(S): at 17:54

## 2024-02-10 RX ADMIN — SODIUM CHLORIDE 1000 MILLILITER(S): 9 INJECTION, SOLUTION INTRAVENOUS at 03:51

## 2024-02-10 RX ADMIN — Medication 30 MILLIGRAM(S): at 11:00

## 2024-02-10 RX ADMIN — Medication 2000 MILLIGRAM(S): at 10:15

## 2024-02-10 RX ADMIN — SODIUM CHLORIDE 2000 MILLILITER(S): 9 INJECTION, SOLUTION INTRAVENOUS at 09:12

## 2024-02-10 RX ADMIN — Medication 1000 MILLIUNIT(S)/MIN: at 10:39

## 2024-02-10 RX ADMIN — SCOPALAMINE 1 PATCH: 1 PATCH, EXTENDED RELEASE TRANSDERMAL at 08:16

## 2024-02-10 RX ADMIN — Medication 975 MILLIGRAM(S): at 11:18

## 2024-02-10 RX ADMIN — ENOXAPARIN SODIUM 40 MILLIGRAM(S): 100 INJECTION SUBCUTANEOUS at 23:13

## 2024-02-10 RX ADMIN — Medication 30 MILLILITER(S): at 09:33

## 2024-02-10 RX ADMIN — Medication 30 MILLIGRAM(S): at 10:29

## 2024-02-10 NOTE — DISCHARGE NOTE OB - NS MD DC FALL RISK RISK
For information on Fall & Injury Prevention, visit: [email or web address] OR  [email or web address] OR  https://www.cdc.gov/steadi/patient.html
For information on Fall & Injury Prevention, visit: [email or web address] OR  [email or web address] OR  https://www.cdc.gov/steadi/patient.html

## 2024-02-10 NOTE — DISCHARGE NOTE OB - MEDICATION SUMMARY - MEDICATIONS TO TAKE
I will START or STAY ON the medications listed below when I get home from the hospital:    ibuprofen 600 mg oral tablet  -- 1 tab(s) by mouth every 6 hours  -- Indication: For pp pain    acetaminophen 325 mg oral tablet  -- 3 tab(s) by mouth every 6 hours  -- Indication: For pp pain    Prenatal 1 Plus 1 oral tablet  -- 1 tab(s) by mouth once a day  -- Indication: For pp

## 2024-02-10 NOTE — DISCHARGE NOTE OB - CHANGE SANITARY PADS FREQUENTLY.  WASHING AND WIPING SHOULD OCCUR FROM FRONT TO BACK
Medical chart reviewed. See intake form. Review Of Systems (ROS):  [x]Performed Review of systems (Integumentary, CardioPulmonary, Neurological) by intake and observation. Intake form has been scanned into medical record. Patient has been instructed to contact their primary care physician regarding ROS issues if not already being addressed at this time. Co-morbidities/Complexities (which will affect course of rehabilitation):   []None           Arthritic conditions   []Rheumatoid arthritis (M05.9)  [x]Osteoarthritis (M19.91)   Cardiovascular conditions   [x]Hypertension (I10)  []Hyperlipidemia (E78.5)  []Angina pectoris (I20)  []Atherosclerosis (I70)   Musculoskeletal conditions   []Disc pathology   []Congenital spine pathologies   []Prior surgical intervention  []Osteoporosis (M81.8)  []Osteopenia (M85.8)   Endocrine conditions   []Hypothyroid (E03.9)  []Hyperthyroid Gastrointestinal conditions   []Constipation (I80.00)   Metabolic conditions   []Morbid obesity (E66.01)  []Diabetes type 1(E10.65) or 2 (E11.65)   []Neuropathy (G60.9)     Pulmonary conditions   [x]Asthma (J45)  []Coughing   []COPD (J44.9)   Psychological Disorders  []Anxiety (F41.9)  [x]Depression (F32.9)   []Other:   [x]Other: left hip AVN ; hx fractured pelvis and fractured rib       Barriers to/and or personal factors that will affect rehab potential:              []Age  []Sex              []Motivation/Lack of Motivation                        [x]Co-Morbidities              []Cognitive Function, education/learning barriers              []Environmental, home barriers              []profession/work barriers  [x]past PT/medical experience  [x]other: smoker  Justification:     Falls Risk Assessment (30 days):   [] Falls Risk assessed and no intervention required.   [] Falls Risk assessed and Patient requires intervention due to being higher risk   TUG score (>12s at risk):     [x] Falls education provided, including use bilateral crutches
G-Codes:   LEFS 89% disability    ASSESSMENT:   Functional Impairments:     [x]Noted lumbar/proximal hip/LE joint hypomobility   [x]Decreased LE functional ROM   [x]Decreased core/proximal hip strength and neuromuscular control   [x]Decreased LE functional strength   [x]Reduced balance/proprioceptive control   []other:      Functional Activity Limitations (from functional questionnaire and intake)   [x]Reduced ability to tolerate prolonged functional positions   [x]Reduced ability or difficulty with changes of positions or transfers between positions   [x]Reduced ability to maintain good posture and demonstrate good body mechanics with sitting, bending, and lifting   [x]Reduced ability to sleep   [x] Reduced ability or tolerance with driving and/or computer work   [x]Reduced ability to perform lifting, carrying tasks   [x]Reduced ability to squat   [x]Reduced ability to forward bend   [x]Reduced ability to ambulate prolonged functional periods/distances/surfaces   [x]Reduced ability to ascend/descend stairs   [x]Reduced ability to run, hop, cut or jump   []other:    Participation Restrictions   [x]Reduced participation in self care activities   [x]Reduced participation in home management activities   [x]Reduced participation in work activities   [x]Reduced participation in social activities. []Reduced participation in sport/recreation activities. Classification :    []Signs/symptoms consistent with post-surgical status including decreased ROM, strength and function.    []Signs/symptoms consistent with joint sprain/strain   []Signs/symptoms consistent with patella-femoral syndrome   [x]Signs/symptoms consistent with knee OA/hip OA   []Signs/symptoms consistent with internal derangement of knee/Hip   []Signs/symptoms consistent with functional hip weakness/NMR control      []Signs/symptoms consistent with tendinitis/tendinosis    []signs/symptoms consistent with pathology which may benefit from Dry needling
[]other:      Prognosis/Rehab Potential:      []Excellent   []Good    [x]Fair   []Poor    Tolerance of evaluation/treatment:    []Excellent   []Good    [x]Fair   []Poor  Physical Therapy Evaluation Complexity Justification  [x] A history of present problem with:  [] no personal factors and/or comorbidities that impact the plan of care;  []1-2 personal factors and/or comorbidities that impact the plan of care  [x]3 personal factors and/or comorbidities that impact the plan of care  [x] An examination of body systems using standardized tests and measures addressing any of the following: body structures and functions (impairments), activity limitations, and/or participation restrictions;:  [] a total of 1-2 or more elements   [] a total of 3 or more elements   [x] a total of 4 or more elements   [x] A clinical presentation with:  [] stable and/or uncomplicated characteristics   [x] evolving clinical presentation with changing characteristics  [] unstable and unpredictable characteristics;   [x] Clinical decision making of [] low, [x] moderate, [] high complexity using standardized patient assessment instrument and/or measurable assessment of functional outcome. [] EVAL (LOW) 48802 (typically 20 minutes face-to-face)  [x] EVAL (MOD) 24699 (typically 30 minutes face-to-face)  [] EVAL (HIGH) 73884 (typically 45 minutes face-to-face)  [] RE-EVAL       PLAN:   Frequency/Duration:  1 days per week for 1 Weeks:  Interventions:  [x]  Therapeutic exercise including: strength training, ROM, for Lower extremity and core   []  NMR activation and proprioception for LE, Glutes and Core   []  Manual therapy as indicated for LE, Hip and spine to include: Dry Needling/IASTM, STM, PROM, Gr I-IV mobilizations, manipulation.    [] Modalities as needed that may include: thermal agents, E-stim, Biofeedback, US, iontophoresis as indicated  [x] Patient education on joint protection, postural re-education, activity modification,
progression of HEP. [x] Patient appears to be functionally prepared for surgery and will continue with a home exercise program until surgery date. [] Patient will be ready for surgery with a short period of structured physical therapy to address functional impairments listed above  [] Patient does not appear to be functionally ready for surgery and requires a prolonged structured program to address functional deficits listed above  [] Recommend cessation of conservative physical therapy measures at this time to consult with referring physician      Patient's post-operative discharge status from hospital should be:   [] Home with home exercise program and outpatient PT  [x] Home with home health care and    [] Skilled nursing facility/ Inpatient Rehab    HEP instruction: (see scanned forms)    GOALS:  Patient stated goal: regaining full motion of knee    Therapist goals for Patient:   Short Term Goals: To be achieved in: 1  weeks  1. Independent in HEP and progression per patient tolerance, in order prepare for TKR surgery.    [] Progressing: [] Met: [] Not Met: [] Adjusted        Electronically signed by:  Rashida Dickey, PT, DPT
Statement Selected
Statement Selected

## 2024-02-10 NOTE — OB PROVIDER DELIVERY SUMMARY - NSSELHIDDEN_OBGYN_ALL_OB_FT
[NS_DeliveryAttending1_OBGYN_ALL_OB_FT:[id number]],[NS_DeliveryAssist1_OBGYN_ALL_OB_FT:[id number]],[NS_DeliveryRN_OBGYN_ALL_OB_FT:[id number]=]

## 2024-02-10 NOTE — OB PROVIDER DELIVERY SUMMARY - NSPROVIDERDELIVERYNOTE_OBGYN_ALL_OB_FT
35yo  presented to L&D at 36w6d for repeat growth US and dopplers in the setting of known FGR, which confirmed EFW fo 2000g (1st %ile, AC < 1st %ile), dopplers wnl. The patient was admitted to L&D and scheduled for a rCS the following day at 37w. Intraoperatively uncomplicated delivery of a viable female infant with nuchal 2x, EBL 102ml, further details can be taken from the operative report.

## 2024-02-10 NOTE — DISCHARGE NOTE OB - HOSPITAL COURSE
Patient underwent a  section. Post-partum course was uncomplicated. Pain is well controlled with PRN medication. She has no difficulty with ambulation, voiding, or PO intake. Lab values and vital signs are stable prior to discharge.

## 2024-02-10 NOTE — DISCHARGE NOTE OB - CARE PLAN
Principal Discharge DX:	S/P  section  Assessment and plan of treatment:	Please call your provider to schedule postoperative staple removal appointment in 2-4 days. Take medications as directed, regular diet, activity as tolerated. Exclusive breast feeding for the first 6 months is recommended. Nothing per vagina for 6 weeks (incl. sex, douching, etc). If you have additional concerns, please inform your provider.   1

## 2024-02-10 NOTE — DISCHARGE NOTE OB - PLAN OF CARE
Please call your provider to schedule postoperative staple removal appointment in 2-4 days. Take medications as directed, regular diet, activity as tolerated. Exclusive breast feeding for the first 6 months is recommended. Nothing per vagina for 6 weeks (incl. sex, douching, etc). If you have additional concerns, please inform your provider.

## 2024-02-10 NOTE — PROGRESS NOTE ADULT - ASSESSMENT
A 34year old  @37w GA EDC  3/2/24 admitted for fetal monitoring secondary to recent diagnosis of severe fetal growth restriction 1st %ile, nl Dopplers. HD#2

## 2024-02-10 NOTE — PROGRESS NOTE ADULT - NSPROGADDITIONALINFOA_GEN_ALL_CORE
MFM Fellow attestation:     A 34year old  @37w GA EDC  3/2/24 admitted for fetal monitoring secondary to diagnosis of severe fetal growth restriction 1st %ile, nl Dopplers. HD#2  Patient scheduled for a repeat  delivery today.     Discussed with Dr. Hairston

## 2024-02-10 NOTE — PROGRESS NOTE ADULT - PROBLEM SELECTOR PLAN 1
- fetal growth restriction in the 1st percentile, nl Dopplers  - NST w good variability, rare self-resolving decelerations overnight  - plan for rCS today

## 2024-02-10 NOTE — DISCHARGE NOTE OB - MEDICATION SUMMARY - MEDICATIONS TO STOP TAKING
[phone]
I will STOP taking the medications listed below when I get home from the hospital:  None

## 2024-02-10 NOTE — DISCHARGE NOTE OB - PATIENT PORTAL LINK FT
You can access the FollowMyHealth Patient Portal offered by [facility] by registering at the following website: [email or web address]. By joining [employer] [facility]’s FollowMyHealth portal, you will also be able to view your health information using other applications (apps) compatible with our system.
You can access the FollowMyHealth Patient Portal offered by [facility] by registering at the following website: [email or web address]. By joining [facility]’s FollowMyHealth portal, you will also be able to view your health information using other applications (apps) compatible with our system.

## 2024-02-11 LAB
BASOPHILS # BLD AUTO: 0.03 K/UL — SIGNIFICANT CHANGE UP (ref 0–0.2)
BASOPHILS NFR BLD AUTO: 0.3 % — SIGNIFICANT CHANGE UP (ref 0–2)
EOSINOPHIL # BLD AUTO: 0.18 K/UL — SIGNIFICANT CHANGE UP (ref 0–0.5)
EOSINOPHIL NFR BLD AUTO: 1.5 % — SIGNIFICANT CHANGE UP (ref 0–6)
HCT VFR BLD CALC: 29.3 % — LOW (ref 34.5–45)
HGB BLD-MCNC: 9.6 G/DL — LOW (ref 11.5–15.5)
IMM GRANULOCYTES NFR BLD AUTO: 0.8 % — SIGNIFICANT CHANGE UP (ref 0–0.9)
LYMPHOCYTES # BLD AUTO: 2.46 K/UL — SIGNIFICANT CHANGE UP (ref 1–3.3)
LYMPHOCYTES # BLD AUTO: 20.6 % — SIGNIFICANT CHANGE UP (ref 13–44)
MCHC RBC-ENTMCNC: 29.9 PG — SIGNIFICANT CHANGE UP (ref 27–34)
MCHC RBC-ENTMCNC: 32.8 GM/DL — SIGNIFICANT CHANGE UP (ref 32–36)
MCV RBC AUTO: 91.3 FL — SIGNIFICANT CHANGE UP (ref 80–100)
MONOCYTES # BLD AUTO: 0.72 K/UL — SIGNIFICANT CHANGE UP (ref 0–0.9)
MONOCYTES NFR BLD AUTO: 6 % — SIGNIFICANT CHANGE UP (ref 2–14)
NEUTROPHILS # BLD AUTO: 8.43 K/UL — HIGH (ref 1.8–7.4)
NEUTROPHILS NFR BLD AUTO: 70.8 % — SIGNIFICANT CHANGE UP (ref 43–77)
PLATELET # BLD AUTO: 232 K/UL — SIGNIFICANT CHANGE UP (ref 150–400)
RBC # BLD: 3.21 M/UL — LOW (ref 3.8–5.2)
RBC # FLD: 13.8 % — SIGNIFICANT CHANGE UP (ref 10.3–14.5)
WBC # BLD: 11.92 K/UL — HIGH (ref 3.8–10.5)
WBC # FLD AUTO: 11.92 K/UL — HIGH (ref 3.8–10.5)

## 2024-02-11 RX ORDER — ACETAMINOPHEN 500 MG
3 TABLET ORAL
Qty: 0 | Refills: 0 | DISCHARGE
Start: 2024-02-11

## 2024-02-11 RX ORDER — IBUPROFEN 200 MG
1 TABLET ORAL
Qty: 0 | Refills: 0 | DISCHARGE
Start: 2024-02-11

## 2024-02-11 RX ORDER — IBUPROFEN 200 MG
600 TABLET ORAL EVERY 6 HOURS
Refills: 0 | Status: DISCONTINUED | OUTPATIENT
Start: 2024-02-11 | End: 2024-02-12

## 2024-02-11 RX ADMIN — Medication 600 MILLIGRAM(S): at 11:51

## 2024-02-11 RX ADMIN — Medication 975 MILLIGRAM(S): at 15:03

## 2024-02-11 RX ADMIN — Medication 30 MILLIGRAM(S): at 06:10

## 2024-02-11 RX ADMIN — ENOXAPARIN SODIUM 40 MILLIGRAM(S): 100 INJECTION SUBCUTANEOUS at 23:41

## 2024-02-11 RX ADMIN — Medication 975 MILLIGRAM(S): at 08:16

## 2024-02-11 RX ADMIN — Medication 600 MILLIGRAM(S): at 17:15

## 2024-02-11 RX ADMIN — Medication 975 MILLIGRAM(S): at 20:48

## 2024-02-11 NOTE — PROGRESS NOTE ADULT - ASSESSMENT
A/P:  34y  now POD#1 s/p repeat  section at 37 weeks gestation for FGR, uncomplicated.  -Vital signs stable  -Hgb: 12.4 -> 9.6  -Urinary retention: straight cath for 800c overnight. Now having urge to void. Recommended waiting until strong urge to void then trying to void spontaneously without distraction.   -Tolerating PO, bowel function nml   -Advance care as tolerated   -Continue routine postpartum and postoperative care and education  -Healthy female infant  - DVT ppx: Lovenox ordered/ Ambulation encouraged. SCDs while in bed.   -Dispo: Patient to be discharged when meeting all postpartum and postoperative milestones and pending attending approval. Continue inpatient care

## 2024-02-12 VITALS
RESPIRATION RATE: 18 BRPM | OXYGEN SATURATION: 98 % | HEART RATE: 81 BPM | DIASTOLIC BLOOD PRESSURE: 74 MMHG | SYSTOLIC BLOOD PRESSURE: 111 MMHG | TEMPERATURE: 99 F

## 2024-02-12 DIAGNOSIS — D62 ACUTE POSTHEMORRHAGIC ANEMIA: ICD-10-CM

## 2024-02-12 PROCEDURE — 80053 COMPREHEN METABOLIC PANEL: CPT

## 2024-02-12 PROCEDURE — 86901 BLOOD TYPING SEROLOGIC RH(D): CPT

## 2024-02-12 PROCEDURE — 36415 COLL VENOUS BLD VENIPUNCTURE: CPT

## 2024-02-12 PROCEDURE — 86900 BLOOD TYPING SEROLOGIC ABO: CPT

## 2024-02-12 PROCEDURE — 86850 RBC ANTIBODY SCREEN: CPT

## 2024-02-12 PROCEDURE — 85730 THROMBOPLASTIN TIME PARTIAL: CPT

## 2024-02-12 PROCEDURE — 59050 FETAL MONITOR W/REPORT: CPT

## 2024-02-12 PROCEDURE — 85025 COMPLETE CBC W/AUTO DIFF WBC: CPT

## 2024-02-12 PROCEDURE — 81003 URINALYSIS AUTO W/O SCOPE: CPT

## 2024-02-12 PROCEDURE — 85384 FIBRINOGEN ACTIVITY: CPT

## 2024-02-12 PROCEDURE — 86780 TREPONEMA PALLIDUM: CPT

## 2024-02-12 PROCEDURE — 84550 ASSAY OF BLOOD/URIC ACID: CPT

## 2024-02-12 RX ADMIN — Medication 975 MILLIGRAM(S): at 09:22

## 2024-02-12 RX ADMIN — Medication 600 MILLIGRAM(S): at 12:24

## 2024-02-12 RX ADMIN — Medication 975 MILLIGRAM(S): at 03:38

## 2024-02-12 RX ADMIN — Medication 600 MILLIGRAM(S): at 00:03

## 2024-02-12 NOTE — PROGRESS NOTE ADULT - ATTENDING COMMENTS
34y  now stable POD#2 s/p repeat  section, asymptomatic anemia will continue PO iron, discharge instructions reviewed.

## 2024-02-12 NOTE — PROGRESS NOTE ADULT - ASSESSMENT
A/P:  34y  now POD#2 s/p repeat  section at 37 weeks gestation for FGR, uncomplicated.  -Vital signs stable  -Hgb: 12.4 -> 9.6  -Tolerating PO, bowel function nml   -Advance care as tolerated   -Continue routine postpartum and postoperative care and education  -Healthy female infant  - DVT ppx: Lovenox ordered/ Ambulation encouraged. SCDs while in bed.   -Dispo: home todya pending attending approval    A/P:  34y  now POD#2 s/p repeat  section at 37 weeks gestation for FGR, uncomplicated.  -Vital signs stable  -Hgb: 12.4 -> 9.6  -Tolerating PO, bowel function nml   -Advance care as tolerated   -Continue routine postpartum and postoperative care and education  -Healthy female infant  - DVT ppx: Lovenox ordered/ Ambulation encouraged. SCDs while in bed.   -Dispo: home today pending attending approval

## 2024-02-12 NOTE — PROGRESS NOTE ADULT - SUBJECTIVE AND OBJECTIVE BOX
INTERVAL HPI/OVERNIGHT EVENTS:  34y Female s/p repeat c section under spinal anesthesia with duramorph for post op analgesia on 02/10/24    Vital Signs Last 24 Hrs  T(C): 36.7 (11 Feb 2024 08:11), Max: 36.9 (11 Feb 2024 00:49)  T(F): 98 (11 Feb 2024 08:11), Max: 98.4 (11 Feb 2024 00:49)  HR: 80 (11 Feb 2024 08:11) (78 - 88)  BP: 108/73 (11 Feb 2024 08:11) (99/52 - 120/71)  BP(mean): --  RR: 18 (11 Feb 2024 08:11) (18 - 18)  SpO2: 98% (11 Feb 2024 08:11) (96% - 98%)    Parameters below as of 11 Feb 2024 08:11  Patient On (Oxygen Delivery Method): room air            Patient's overall anesthesia satisfaction: Positive    Patients pain is well controlled with IT duramorph    No respiratory events overnight    No pruritis at this time    Patient doing well     No headache      No residual numbness or weakness, sensory and motor function intact.    No anesthetic complications or complaints noted or reported          .            
MALIKA SHELL is a 34y  now POD#2 s/p repeat  section at 37 weeks gestation for FGR, uncomplicated.    S:    Patient seen this morning, feeling well. Voiding spontaneously.  Reports she feels ready to go home.     O:    Vital Signs Last 24 Hrs  T(C): 37.2 (2024 04:23), Max: 37.2 (2024 04:23)  T(F): 98.9 (2024 04:23), Max: 98.9 (2024 04:23)  HR: 81 (2024 04:23) (80 - 84)  BP: 111/74 (2024 04:23) (95/73 - 111/74)                       9.6    11.92 )-----------( 232      ( 2024 05:11 )             29.3         Gen: NAD, AOx3  CV: RRR, S1/S2 present  Pulm: CTAB  Abdomen:  Soft, non-tender, non-distended, +bowel sounds  Incision: Clean/dry/intact with staples in place   Uterus:  Fundus firm below umbilicus  VE:  Expected lochia  Ext:  Bilateral lower extremities non-tender and non-edematous        
MALIKA SHELL is a 34y  now POD#1 s/p repeat  section at 37 weeks gestation for FGR, uncomplicated.    S:    Straight catheter overnight for retention of 800cc. Now has feeling that she could void.   The patient has no complaints.  Pain controlled with current treatment regimen.   She is ambulating without difficulty and tolerating PO.   + flatus/-BM/- voiding.   She endorses appropriate lochia, which is decreasing.   She denies fevers, chills, nausea and vomiting.   She denies lightheadedness, dizziness, palpitations, chest pain and SOB.     O:    T(C): 36.5 (24 @ 04:42), Max: 37.1 (02-10-24 @ 16:13)  HR: 88 (24 @ 04:42) (67 - 94)  BP: 120/71 (24 @ 04:42) (93/53 - 132/52)  RR: 18 (24 @ 04:42) (16 - 18)  SpO2: 96% (24 @ 04:42) (96% - 100%)    Gen: NAD, AOx3  CV: RRR, S1/S2 present  Pulm: CTAB  Abdomen:  Soft, non-tender, non-distended, +bowel sounds  Incision: Clean/dry/intact with staples in place   Uterus:  Fundus firm below umbilicus  VE:  Expected lochia  Ext:  Bilateral lower extremities non-tender and non-edematous                          9.6    11.92 )-----------( 232      ( 2024 05:11 )             29.3         137  |  93<L>  |  9.7  ----------------------------<  66<L>  4.8   |  22.0  |  0.52    Ca    8.7      2024 16:15    TPro  6.7  /  Alb  3.3  /  TBili  0.3<L>  /  DBili  x   /  AST  31  /  ALT  14  /  AlkPhos  121<H>      
MALIKA SUMAYA  [facility] DEL[facility] OBWR 14  A 34year old  @37w GA EDC  3/2/24 admitted for fetal monitoring secondary to diagnosis of severe fetal growth restriction 1st %ile, nl Dopplers. HD#2     Patient has no complaints this morning.  States she is feeling well.  Denies any pelvic cramping or contractions.  Denies leakage of fluid or vaginal bleeding.  Endorses fetal movements.   NPO      Vital Signs:  Vital Signs Last 24 Hrs  T(C): 36.6 (10 Feb 2024 00:08), Max: 36.8 (2024 15:40)  T(F): 97.88 (10 Feb 2024 00:08), Max: 98.2 (2024 15:40)  HR: 74 (10 Feb 2024 03:47) (74 - 104)  BP: 106/58 (10 Feb 2024 00:07) (106/58 - 119/78)  RR: 16 (2024 15:40) (16 - 16)  SpO2: 97% (10 Feb 2024 03:47) (97% - 99%)    Parameters below as of 2024 15:40  Patient On (Oxygen Delivery Method): room air      Height (cm): 160 (24 @ 16:03)  Weight (kg): 108 (24 @ 16:03)  BMI (kg/m2): 42.2 (24 @ 16:03)  BSA (m2): 2.08 (24 @ 16:03)    Physical Exam:  General: Adult female in NAD  Neuro: A&Ox3  Resp: breathing comfortably on RA   Abd: nontender, gravid   Pelvic: Deferred  Skin: No rashes or lesions on exposed skin    Labs:                          12.4   12.65 )-----------( 298      ( 2024 16:15 )             37.3     -    137  |  93<L>  |  9.7  ----------------------------<  66<L>  4.8   |  22.0  |  0.52    Ca    8.7      2024 16:15    TPro  6.7  /  Alb  3.3  /  TBili  0.3<L>  /  DBili  x   /  AST  31  /  ALT  14  /  AlkPhos  121<H>      PTT - ( 2024 16:15 )  PTT:26.4 sec      MEDICATIONS  (STANDING):  famotidine    Tablet 20 milliGRAM(s) Oral once

## 2024-02-21 DIAGNOSIS — O99.213 OBESITY COMPLICATING PREGNANCY, THIRD TRIMESTER: ICD-10-CM

## 2024-02-21 DIAGNOSIS — O34.219 MATERNAL CARE FOR UNSPECIFIED TYPE SCAR FROM PREVIOUS CESAREAN DELIVERY: ICD-10-CM

## 2024-06-12 NOTE — OB RN DELIVERY SUMMARY - BABY A: APGAR 1 MIN REFLEX IRRITABILITY, DELIVERY
Sunscreen Recommendations: Samples of LRP tinted mineral SPF and Eucerin sensitive skin SPF provided Detail Level: Zone Detail Level: Detailed (2) cough or sneeze

## 2024-06-13 NOTE — OB RN PATIENT PROFILE - NS_PREOPBLOODCONS_OBGYN_ALL_OB
Problem: Safety - Adult  Goal: Free from fall injury  6/13/2024 0410 by Leona Pina RN  Outcome: Progressing     Problem: Chronic Conditions and Co-morbidities  Goal: Patient's chronic conditions and co-morbidity symptoms are monitored and maintained or improved  6/13/2024 0410 by Leona Pina RN  Outcome: Progressing     Problem: Discharge Planning  Goal: Discharge to home or other facility with appropriate resources  6/13/2024 0410 by Leona Pina RN  Outcome: Progressing    Problem: Skin/Tissue Integrity  Goal: Absence of new skin breakdown  Description: 1.  Monitor for areas of redness and/or skin breakdown  2.  Assess vascular access sites hourly  3.  Every 4-6 hours minimum:  Change oxygen saturation probe site  4.  Every 4-6 hours:  If on nasal continuous positive airway pressure, respiratory therapy assess nares and determine need for appliance change or resting period.  6/13/2024 0410 by Leona Pina RN  Outcome: Progressing     Problem: ABCDS Injury Assessment  Goal: Absence of physical injury  6/13/2024 0410 by Leona Pina RN  Outcome: Progressing     Problem: Pain  Goal: Verbalizes/displays adequate comfort level or baseline comfort level  6/13/2024 0410 by Leona Pina RN  Outcome: Progressing      n/a

## 2024-07-18 PROCEDURE — 0: CUSTOM

## 2024-12-19 NOTE — OB RN DELIVERY SUMMARY - NS_RESUSCITEFFORT_OBGYN_ALL_OB
FAMILY HISTORY:  Family history of diabetes mellitus    Father  Still living? Unknown  Family history of COPD (chronic obstructive pulmonary disease), Age at diagnosis: Age Unknown    Mother  Still living? Unknown  Family history of heart attack, Age at diagnosis: Age Unknown  FH: pancreatic cancer, Age at diagnosis: Age Unknown    Grandparent  Still living? Unknown  FH: leukemia, Age at diagnosis: Age Unknown  FH: stomach cancer, Age at diagnosis: Age Unknown    
Bulb Alfredo-Pharynx Suction Only